# Patient Record
Sex: FEMALE | Race: WHITE | Employment: FULL TIME | URBAN - NONMETROPOLITAN AREA
[De-identification: names, ages, dates, MRNs, and addresses within clinical notes are randomized per-mention and may not be internally consistent; named-entity substitution may affect disease eponyms.]

---

## 2017-01-17 RX ORDER — DROSPIRENONE AND ETHINYL ESTRADIOL 0.02-3(28)
1 KIT ORAL DAILY
Qty: 84 TABLET | Refills: 1 | Status: SHIPPED | OUTPATIENT
Start: 2017-01-17 | End: 2017-06-20 | Stop reason: ALTCHOICE

## 2017-05-19 ENCOUNTER — TELEPHONE (OUTPATIENT)
Dept: FAMILY MEDICINE CLINIC | Age: 22
End: 2017-05-19

## 2017-05-19 DIAGNOSIS — B37.31 VAGINAL YEAST INFECTION: ICD-10-CM

## 2017-05-19 DIAGNOSIS — N39.0 URINARY TRACT INFECTION, SITE UNSPECIFIED: Primary | ICD-10-CM

## 2017-06-20 ENCOUNTER — OFFICE VISIT (OUTPATIENT)
Dept: OBGYN CLINIC | Age: 22
End: 2017-06-20

## 2017-06-20 VITALS
HEART RATE: 91 BPM | HEIGHT: 65 IN | TEMPERATURE: 97.9 F | SYSTOLIC BLOOD PRESSURE: 118 MMHG | DIASTOLIC BLOOD PRESSURE: 62 MMHG | WEIGHT: 108.25 LBS | BODY MASS INDEX: 18.03 KG/M2

## 2017-06-20 DIAGNOSIS — N92.1 METRORRHAGIA: Primary | ICD-10-CM

## 2017-06-20 DIAGNOSIS — R63.6 UNDERWEIGHT: ICD-10-CM

## 2017-06-20 LAB
BASOPHILS ABSOLUTE: 0 K/UL (ref 0–0.2)
BASOPHILS RELATIVE PERCENT: 0.3 %
EOSINOPHILS ABSOLUTE: 0.1 K/UL (ref 0–0.6)
EOSINOPHILS RELATIVE PERCENT: 2.2 %
HCT VFR BLD CALC: 41.4 % (ref 36–48)
HEMOGLOBIN: 13.4 G/DL (ref 12–16)
LYMPHOCYTES ABSOLUTE: 0.9 K/UL (ref 1–5.1)
LYMPHOCYTES RELATIVE PERCENT: 26.8 %
MCH RBC QN AUTO: 29.4 PG (ref 26–34)
MCHC RBC AUTO-ENTMCNC: 32.3 G/DL (ref 31–36)
MCV RBC AUTO: 91.1 FL (ref 80–100)
MONOCYTES ABSOLUTE: 0.3 K/UL (ref 0–1.3)
MONOCYTES RELATIVE PERCENT: 9.7 %
NEUTROPHILS ABSOLUTE: 2.1 K/UL (ref 1.7–7.7)
NEUTROPHILS RELATIVE PERCENT: 61 %
PDW BLD-RTO: 13.5 % (ref 12.4–15.4)
PLATELET # BLD: 322 K/UL (ref 135–450)
PMV BLD AUTO: 8.6 FL (ref 5–10.5)
PROLACTIN: 16.5 NG/ML
RBC # BLD: 4.54 M/UL (ref 4–5.2)
TSH REFLEX: 1.29 UIU/ML (ref 0.27–4.2)
WBC # BLD: 3.5 K/UL (ref 4–11)

## 2017-06-20 PROCEDURE — 36415 COLL VENOUS BLD VENIPUNCTURE: CPT | Performed by: OBSTETRICS & GYNECOLOGY

## 2017-06-20 PROCEDURE — 99202 OFFICE O/P NEW SF 15 MIN: CPT | Performed by: OBSTETRICS & GYNECOLOGY

## 2017-06-20 RX ORDER — ACETAMINOPHEN 500 MG
TABLET ORAL
COMMUNITY
End: 2017-09-28 | Stop reason: CLARIF

## 2017-06-20 RX ORDER — IBUPROFEN 200 MG
TABLET ORAL
COMMUNITY
End: 2018-08-22

## 2017-06-20 RX ORDER — LEVONORGESTREL AND ETHINYL ESTRADIOL 0.15-0.03
1 KIT ORAL DAILY
Qty: 1 PACKET | Refills: 3 | Status: SHIPPED | OUTPATIENT
Start: 2017-06-20 | End: 2017-08-14 | Stop reason: SDUPTHER

## 2017-06-20 ASSESSMENT — ENCOUNTER SYMPTOMS
NAUSEA: 0
DIARRHEA: 0
ABDOMINAL PAIN: 0
CONSTIPATION: 0
VOMITING: 0
SHORTNESS OF BREATH: 0
RESPIRATORY NEGATIVE: 1
GASTROINTESTINAL NEGATIVE: 1

## 2017-06-20 ASSESSMENT — PATIENT HEALTH QUESTIONNAIRE - PHQ9
SUM OF ALL RESPONSES TO PHQ9 QUESTIONS 1 & 2: 0
SUM OF ALL RESPONSES TO PHQ QUESTIONS 1-9: 0
1. LITTLE INTEREST OR PLEASURE IN DOING THINGS: 0
2. FEELING DOWN, DEPRESSED OR HOPELESS: 0

## 2017-08-14 ENCOUNTER — OFFICE VISIT (OUTPATIENT)
Dept: OBGYN CLINIC | Age: 22
End: 2017-08-14

## 2017-08-14 VITALS
BODY MASS INDEX: 18.86 KG/M2 | HEART RATE: 100 BPM | WEIGHT: 111.6 LBS | SYSTOLIC BLOOD PRESSURE: 128 MMHG | DIASTOLIC BLOOD PRESSURE: 74 MMHG | TEMPERATURE: 97.9 F

## 2017-08-14 DIAGNOSIS — Z01.419 WOMEN'S ANNUAL ROUTINE GYNECOLOGICAL EXAMINATION: Primary | ICD-10-CM

## 2017-08-14 DIAGNOSIS — R63.6 UNDERWEIGHT: ICD-10-CM

## 2017-08-14 DIAGNOSIS — B37.31 YEAST VAGINITIS: ICD-10-CM

## 2017-08-14 DIAGNOSIS — N92.1 METRORRHAGIA: ICD-10-CM

## 2017-08-14 PROCEDURE — 99395 PREV VISIT EST AGE 18-39: CPT | Performed by: OBSTETRICS & GYNECOLOGY

## 2017-08-14 PROCEDURE — 87210 SMEAR WET MOUNT SALINE/INK: CPT | Performed by: OBSTETRICS & GYNECOLOGY

## 2017-08-14 RX ORDER — LEVONORGESTREL AND ETHINYL ESTRADIOL 0.15-0.03
1 KIT ORAL DAILY
Qty: 1 PACKET | Refills: 3 | Status: SHIPPED | OUTPATIENT
Start: 2017-08-14 | End: 2017-12-28 | Stop reason: SDUPTHER

## 2017-08-14 RX ORDER — FLUCONAZOLE 150 MG/1
TABLET ORAL
Qty: 2 TABLET | Refills: 1 | Status: SHIPPED | OUTPATIENT
Start: 2017-08-14 | End: 2017-09-28 | Stop reason: ALTCHOICE

## 2017-08-29 ASSESSMENT — ENCOUNTER SYMPTOMS
NAUSEA: 0
ABDOMINAL PAIN: 0
CONSTIPATION: 0
VOMITING: 0
SHORTNESS OF BREATH: 0
ABDOMINAL DISTENTION: 0
DIARRHEA: 0

## 2017-09-28 ENCOUNTER — OFFICE VISIT (OUTPATIENT)
Dept: FAMILY MEDICINE CLINIC | Age: 22
End: 2017-09-28

## 2017-09-28 VITALS
OXYGEN SATURATION: 100 % | HEIGHT: 65 IN | DIASTOLIC BLOOD PRESSURE: 82 MMHG | HEART RATE: 109 BPM | SYSTOLIC BLOOD PRESSURE: 120 MMHG | WEIGHT: 113 LBS | BODY MASS INDEX: 18.83 KG/M2 | TEMPERATURE: 98.5 F

## 2017-09-28 DIAGNOSIS — J20.9 ACUTE BRONCHITIS, UNSPECIFIED ORGANISM: Primary | ICD-10-CM

## 2017-09-28 PROCEDURE — 99213 OFFICE O/P EST LOW 20 MIN: CPT | Performed by: FAMILY MEDICINE

## 2017-09-28 RX ORDER — PROMETHAZINE HYDROCHLORIDE AND CODEINE PHOSPHATE 6.25; 1 MG/5ML; MG/5ML
5 SYRUP ORAL EVERY 6 HOURS PRN
Qty: 120 ML | Refills: 1 | Status: SHIPPED | OUTPATIENT
Start: 2017-09-28 | End: 2018-04-25 | Stop reason: ALTCHOICE

## 2017-09-28 RX ORDER — AZITHROMYCIN 250 MG/1
TABLET, FILM COATED ORAL
Qty: 1 PACKET | Refills: 0 | Status: SHIPPED | OUTPATIENT
Start: 2017-09-28 | End: 2018-04-25 | Stop reason: ALTCHOICE

## 2017-09-28 ASSESSMENT — ENCOUNTER SYMPTOMS
DIARRHEA: 1
SHORTNESS OF BREATH: 0
COUGH: 1
CHOKING: 0
WHEEZING: 0

## 2017-12-28 RX ORDER — LEVONORGESTREL AND ETHINYL ESTRADIOL 0.15-0.03
1 KIT ORAL DAILY
Qty: 1 PACKET | Refills: 0 | Status: SHIPPED | OUTPATIENT
Start: 2017-12-28 | End: 2018-08-22 | Stop reason: SDUPTHER

## 2017-12-28 NOTE — TELEPHONE ENCOUNTER
[address] calling for a one month fill of her OCP. Patient will not be getting her home delivery in time. They are requesting a one time fill to her [facility].     Patient last seen in Office on 8/14/17 for annual exam.     Pended OCP, routing to MD to sign.

## 2018-01-04 ENCOUNTER — TELEPHONE (OUTPATIENT)
Dept: OBGYN CLINIC | Age: 23
End: 2018-01-04

## 2018-01-04 NOTE — TELEPHONE ENCOUNTER
Pt calling with c/o yeast infection. Vaginal discharge is thick white. Itch present. Denies urinary frequency. Denies urinary urgency. Denies pain with urination. No discolor to urine. No odor. No pelvic pain. No fevers. Last OV 8/14/17 pap was deferred. Pt states she has tried vagisil wipes and cream. She also has already taken Diflucan 1 dose on Monday and the second dose on Tuesday but has not got any relief.  Med pended Please advise

## 2018-01-12 ENCOUNTER — TELEPHONE (OUTPATIENT)
Dept: OBGYN CLINIC | Age: 23
End: 2018-01-12

## 2018-01-12 RX ORDER — METRONIDAZOLE 500 MG/1
500 TABLET ORAL 2 TIMES DAILY
Qty: 14 TABLET | Refills: 0 | Status: SHIPPED | OUTPATIENT
Start: 2018-01-12 | End: 2018-01-19

## 2018-01-12 NOTE — TELEPHONE ENCOUNTER
Pt calling with concerns for vaginal odor. She was treated on 1/4/17 for yeast concerns. She says she is no longer having itch or white discharge but now has odor and a slight yellow discharge. An appointment was made for 1/17/18. Pt is requesting medication until able to be seen. Pended Flagyl. Please sign if appropriate.

## 2018-02-19 ENCOUNTER — OFFICE VISIT (OUTPATIENT)
Dept: FAMILY MEDICINE CLINIC | Age: 23
End: 2018-02-19

## 2018-02-19 VITALS
HEART RATE: 131 BPM | OXYGEN SATURATION: 98 % | DIASTOLIC BLOOD PRESSURE: 77 MMHG | SYSTOLIC BLOOD PRESSURE: 117 MMHG | TEMPERATURE: 98.1 F | BODY MASS INDEX: 19.16 KG/M2 | HEIGHT: 65 IN | WEIGHT: 115 LBS

## 2018-02-19 DIAGNOSIS — J02.9 ACUTE VIRAL PHARYNGITIS: ICD-10-CM

## 2018-02-19 DIAGNOSIS — J40 BRONCHITIS: Primary | ICD-10-CM

## 2018-02-19 LAB — S PYO AG THROAT QL: NORMAL

## 2018-02-19 PROCEDURE — 87880 STREP A ASSAY W/OPTIC: CPT | Performed by: NURSE PRACTITIONER

## 2018-02-19 PROCEDURE — 99214 OFFICE O/P EST MOD 30 MIN: CPT | Performed by: NURSE PRACTITIONER

## 2018-02-19 RX ORDER — AMOXICILLIN 875 MG/1
875 TABLET, COATED ORAL 2 TIMES DAILY
Qty: 14 TABLET | Refills: 0 | Status: SHIPPED | OUTPATIENT
Start: 2018-02-19 | End: 2018-02-26

## 2018-02-19 RX ORDER — METHYLPREDNISOLONE 4 MG/1
TABLET ORAL
Qty: 1 KIT | Refills: 0 | Status: SHIPPED | OUTPATIENT
Start: 2018-02-19 | End: 2018-02-25

## 2018-02-19 ASSESSMENT — ENCOUNTER SYMPTOMS
RHINORRHEA: 1
BLOOD IN STOOL: 0
SINUS PAIN: 0
WHEEZING: 0
COLOR CHANGE: 0
SINUS PRESSURE: 1
SORE THROAT: 1
CHEST TIGHTNESS: 1
EYES NEGATIVE: 1
SHORTNESS OF BREATH: 0
COUGH: 1
CONSTIPATION: 0
ABDOMINAL PAIN: 0
VOMITING: 0
SWOLLEN GLANDS: 1
NAUSEA: 0
DIARRHEA: 0

## 2018-02-19 NOTE — LETTER
[facility]  [address] [address]  Phone: [phone]  Fax: [phone]    Lucero Schaefer, [facility] [place]        February 19, 2018     Patient: Roly Swain   YOB: 1995   Date of Visit: 2/19/2018       To Whom it May Concern:    Roly Swain was seen in my clinic on 2/19/2018. If you have any questions or concerns, please don't hesitate to call.     Sincerely,           Lucero Schaefer, CNP

## 2018-04-25 ENCOUNTER — OFFICE VISIT (OUTPATIENT)
Dept: OBGYN CLINIC | Age: 23
End: 2018-04-25

## 2018-04-25 VITALS
BODY MASS INDEX: 20.35 KG/M2 | DIASTOLIC BLOOD PRESSURE: 86 MMHG | SYSTOLIC BLOOD PRESSURE: 122 MMHG | WEIGHT: 120.4 LBS | HEART RATE: 98 BPM | TEMPERATURE: 98.1 F

## 2018-04-25 DIAGNOSIS — B96.89 BACTERIAL VAGINOSIS: Primary | ICD-10-CM

## 2018-04-25 DIAGNOSIS — N76.0 BACTERIAL VAGINOSIS: Primary | ICD-10-CM

## 2018-04-25 DIAGNOSIS — N89.8 VAGINAL DISCHARGE: ICD-10-CM

## 2018-04-25 DIAGNOSIS — R30.0 DYSURIA: ICD-10-CM

## 2018-04-25 LAB
BACTERIA: ABNORMAL /HPF
BILIRUBIN URINE: NEGATIVE
BLOOD, URINE: NEGATIVE
CLARITY: ABNORMAL
COLOR: YELLOW
EPITHELIAL CELLS, UA: 6 /HPF (ref 0–5)
GLUCOSE URINE: NEGATIVE MG/DL
HYALINE CASTS: 5 /LPF (ref 0–8)
KETONES, URINE: NEGATIVE MG/DL
LEUKOCYTE ESTERASE, URINE: ABNORMAL
MICROSCOPIC EXAMINATION: YES
NITRITE, URINE: NEGATIVE
PH UA: 7
PROTEIN UA: NEGATIVE MG/DL
RBC UA: 5 /HPF (ref 0–4)
SPECIFIC GRAVITY UA: 1.01
UROBILINOGEN, URINE: 1 E.U./DL
WBC UA: 3 /HPF (ref 0–5)

## 2018-04-25 PROCEDURE — 81001 URINALYSIS AUTO W/SCOPE: CPT | Performed by: OBSTETRICS & GYNECOLOGY

## 2018-04-25 PROCEDURE — 99213 OFFICE O/P EST LOW 20 MIN: CPT | Performed by: OBSTETRICS & GYNECOLOGY

## 2018-04-25 RX ORDER — FLUCONAZOLE 150 MG/1
TABLET ORAL
Qty: 2 TABLET | Refills: 1 | Status: SHIPPED | OUTPATIENT
Start: 2018-04-25 | End: 2018-08-22

## 2018-04-25 RX ORDER — METRONIDAZOLE 500 MG/1
500 TABLET ORAL 2 TIMES DAILY
Qty: 14 TABLET | Refills: 0 | Status: SHIPPED | OUTPATIENT
Start: 2018-04-25 | End: 2018-05-02

## 2018-04-25 ASSESSMENT — ENCOUNTER SYMPTOMS
DIARRHEA: 0
ABDOMINAL PAIN: 0
NAUSEA: 0
VOMITING: 0
CONSTIPATION: 0
SHORTNESS OF BREATH: 0

## 2018-04-26 LAB
CANDIDA SPECIES, DNA PROBE: NORMAL
GARDNERELLA VAGINALIS, DNA PROBE: NORMAL
TRICHOMONAS VAGINALIS DNA: NORMAL

## 2018-04-27 LAB
ORGANISM: ABNORMAL
URINE CULTURE, ROUTINE: ABNORMAL
URINE CULTURE, ROUTINE: ABNORMAL

## 2018-08-22 ENCOUNTER — OFFICE VISIT (OUTPATIENT)
Dept: FAMILY MEDICINE CLINIC | Age: 23
End: 2018-08-22

## 2018-08-22 VITALS
HEART RATE: 97 BPM | OXYGEN SATURATION: 98 % | WEIGHT: 122.6 LBS | DIASTOLIC BLOOD PRESSURE: 92 MMHG | SYSTOLIC BLOOD PRESSURE: 121 MMHG | BODY MASS INDEX: 20.72 KG/M2

## 2018-08-22 DIAGNOSIS — F43.9 SITUATIONAL STRESS: Primary | ICD-10-CM

## 2018-08-22 PROCEDURE — G0444 DEPRESSION SCREEN ANNUAL: HCPCS | Performed by: FAMILY MEDICINE

## 2018-08-22 PROCEDURE — 99214 OFFICE O/P EST MOD 30 MIN: CPT | Performed by: FAMILY MEDICINE

## 2018-08-22 RX ORDER — SERTRALINE HYDROCHLORIDE 25 MG/1
25 TABLET, FILM COATED ORAL DAILY
Qty: 30 TABLET | Refills: 1 | Status: SHIPPED | OUTPATIENT
Start: 2018-08-22 | End: 2018-09-18 | Stop reason: SDUPTHER

## 2018-08-22 RX ORDER — DOXEPIN HYDROCHLORIDE 10 MG/1
10 CAPSULE ORAL NIGHTLY PRN
Qty: 15 CAPSULE | Refills: 0 | Status: SHIPPED | OUTPATIENT
Start: 2018-08-22 | End: 2018-09-18 | Stop reason: SDUPTHER

## 2018-08-22 ASSESSMENT — PATIENT HEALTH QUESTIONNAIRE - PHQ9
3. TROUBLE FALLING OR STAYING ASLEEP: 3
10. IF YOU CHECKED OFF ANY PROBLEMS, HOW DIFFICULT HAVE THESE PROBLEMS MADE IT FOR YOU TO DO YOUR WORK, TAKE CARE OF THINGS AT HOME, OR GET ALONG WITH OTHER PEOPLE: 1
SUM OF ALL RESPONSES TO PHQ9 QUESTIONS 1 & 2: 6
1. LITTLE INTEREST OR PLEASURE IN DOING THINGS: 3
7. TROUBLE CONCENTRATING ON THINGS, SUCH AS READING THE NEWSPAPER OR WATCHING TELEVISION: 1
SUM OF ALL RESPONSES TO PHQ QUESTIONS 1-9: 18
8. MOVING OR SPEAKING SO SLOWLY THAT OTHER PEOPLE COULD HAVE NOTICED. OR THE OPPOSITE, BEING SO FIGETY OR RESTLESS THAT YOU HAVE BEEN MOVING AROUND A LOT MORE THAN USUAL: 1
6. FEELING BAD ABOUT YOURSELF - OR THAT YOU ARE A FAILURE OR HAVE LET YOURSELF OR YOUR FAMILY DOWN: 1
2. FEELING DOWN, DEPRESSED OR HOPELESS: 3
4. FEELING TIRED OR HAVING LITTLE ENERGY: 3
9. THOUGHTS THAT YOU WOULD BE BETTER OFF DEAD, OR OF HURTING YOURSELF: 0
SUM OF ALL RESPONSES TO PHQ QUESTIONS 1-9: 18
5. POOR APPETITE OR OVEREATING: 3

## 2018-08-22 NOTE — PROGRESS NOTES
Subjective:      Patient ID: Raven El is a 20 y.o. female. HPI  Chief Complaint   Patient presents with    Anxiety     having attacks     Insomnia     going on since end of may     Anorexia     Chief complaint present illness: 68-year-old white female presents with complaints of increased anxiety, trouble sleeping, and repeatedly saying she just had as much she can take. This is not a desperation or suicidal or homicidal but rather the stresses of trying to not cut off relationships with her mother in spite of her mother's incessant, and the patient's opinion, efforts to make her feel guilty about the patient's relationship with her live-in boyfriend and lack of going to [employer] as much as her mother would like her to. Patient tries to maintain contact with her mother who expects to be talked to at least once a day. She does this partially so that her mother will go crazy or drive her crazy or drive her father, whom she feels bad 4, crazy. Patient is also unhappy in her current job. She changed jobs in March 2018 and is still on probation which is not unusual but she says this job is not what she thought it would be. She doesn't like what she does and she doesn't like the pain that she receives for doing it. Patient just states she's anxious a lot. Denies any significant severe depressive symptomatology. Finds that the only way she deals with her anxiety is by incessantly cleaning and obsessively doing things. Has trouble turning her brain off at night. Review of Systems   All other systems reviewed and are negative. background/entire past medical,social and family history obtained and reviewed/updated today   Objective:   Physical Exam   Constitutional: She appears well-developed and well-nourished. She appears distressed. Psychiatric: Her behavior is normal. Judgment and thought content normal. Her mood appears anxious. Her affect is not angry, not blunt and not labile.  Her speech is not rapid and/or pressured and not tangential. She is not agitated, not hyperactive and not withdrawn. Cognition and memory are normal. She does not express impulsivity. She does not exhibit a depressed mood. Nursing note and vitals reviewed. BP (!) 121/92 (Site: Left Arm, Position: Sitting, Cuff Size: Small Adult)   Pulse 97   Wt 122 lb 9.6 oz (55.6 kg)   LMP 05/22/2018   SpO2 98%   BMI 20.72 kg/m²   >30 minutes with patient, all one on one or with accompanying family member re illness, possible etiologies,w/u and treatment with greater than 50% of time in counseling for: Anxiety; discussion also about counseling. This apparently did not work one time her mother went. [facility] and could be heard from the waiting room of the counselors office. Assessment:      Alicia Kirby was seen today for anxiety, insomnia and anorexia. Diagnoses and all orders for this visit:    Situational stress  -     sertraline (ZOLOFT) 25 MG tablet; Take 1 tablet by mouth daily  -     doxepin (SINEQUAN) 10 MG capsule; Take 1 capsule by mouth nightly as needed (sleep)           Plan:      Return in about 4 weeks (around 9/19/2018).    Patient Instructions   new med;explained s.e;how to take;indications            Karine Duc

## 2018-09-11 ENCOUNTER — APPOINTMENT (OUTPATIENT)
Dept: CT IMAGING | Age: 23
End: 2018-09-11
Payer: COMMERCIAL

## 2018-09-11 ENCOUNTER — HOSPITAL ENCOUNTER (EMERGENCY)
Age: 23
Discharge: HOME | End: 2018-09-11
Payer: COMMERCIAL

## 2018-09-11 VITALS
DIASTOLIC BLOOD PRESSURE: 83 MMHG | TEMPERATURE: 98 F | HEIGHT: 65 IN | SYSTOLIC BLOOD PRESSURE: 140 MMHG | BODY MASS INDEX: 19.99 KG/M2 | RESPIRATION RATE: 17 BRPM | WEIGHT: 120 LBS | OXYGEN SATURATION: 98 % | HEART RATE: 87 BPM

## 2018-09-11 DIAGNOSIS — N20.1 RIGHT URETERAL STONE: Primary | ICD-10-CM

## 2018-09-11 DIAGNOSIS — R11.0 NAUSEA: ICD-10-CM

## 2018-09-11 DIAGNOSIS — R91.8 ABNORMAL CT SCAN OF LUNG: ICD-10-CM

## 2018-09-11 DIAGNOSIS — N23 RENAL COLIC: ICD-10-CM

## 2018-09-11 LAB
A/G RATIO: 1.4 (ref 1.1–2.2)
ALBUMIN SERPL-MCNC: 4.3 G/DL (ref 3.4–5)
ALP BLD-CCNC: 51 U/L (ref 40–129)
ALT SERPL-CCNC: 10 U/L (ref 10–40)
ANION GAP SERPL CALCULATED.3IONS-SCNC: 18 MMOL/L (ref 3–16)
AST SERPL-CCNC: 13 U/L (ref 15–37)
BACTERIA: ABNORMAL /HPF
BASOPHILS ABSOLUTE: 0.1 K/UL (ref 0–0.2)
BASOPHILS RELATIVE PERCENT: 0.7 %
BILIRUB SERPL-MCNC: 0.5 MG/DL (ref 0–1)
BILIRUBIN URINE: NEGATIVE
BLOOD, URINE: ABNORMAL
BUN BLDV-MCNC: 9 MG/DL (ref 7–20)
CALCIUM SERPL-MCNC: 9 MG/DL (ref 8.3–10.6)
CHLORIDE BLD-SCNC: 102 MMOL/L (ref 99–110)
CLARITY: CLEAR
CO2: 19 MMOL/L (ref 21–32)
COLOR: YELLOW
CREAT SERPL-MCNC: 0.7 MG/DL (ref 0.6–1.1)
EOSINOPHILS ABSOLUTE: 0.1 K/UL (ref 0–0.6)
EOSINOPHILS RELATIVE PERCENT: 0.6 %
EPITHELIAL CELLS, UA: ABNORMAL /HPF
GFR AFRICAN AMERICAN: >60
GFR NON-AFRICAN AMERICAN: >60
GLOBULIN: 3 G/DL
GLUCOSE BLD-MCNC: 111 MG/DL (ref 70–99)
GLUCOSE URINE: NEGATIVE MG/DL
HCG QUALITATIVE: NEGATIVE
HCT VFR BLD CALC: 38.3 % (ref 36–48)
HEMOGLOBIN: 13 G/DL (ref 12–16)
KETONES, URINE: >=80 MG/DL
LEUKOCYTE ESTERASE, URINE: NEGATIVE
LIPASE: 18 U/L (ref 13–60)
LYMPHOCYTES ABSOLUTE: 0.9 K/UL (ref 1–5.1)
LYMPHOCYTES RELATIVE PERCENT: 9.7 %
MAGNESIUM: 2 MG/DL (ref 1.8–2.4)
MCH RBC QN AUTO: 30.4 PG (ref 26–34)
MCHC RBC AUTO-ENTMCNC: 34 G/DL (ref 31–36)
MCV RBC AUTO: 89.6 FL (ref 80–100)
MICROSCOPIC EXAMINATION: YES
MONOCYTES ABSOLUTE: 0.6 K/UL (ref 0–1.3)
MONOCYTES RELATIVE PERCENT: 6.1 %
MUCUS: ABNORMAL /LPF
NEUTROPHILS ABSOLUTE: 7.7 K/UL (ref 1.7–7.7)
NEUTROPHILS RELATIVE PERCENT: 82.9 %
NITRITE, URINE: NEGATIVE
PDW BLD-RTO: 13.2 % (ref 12.4–15.4)
PH UA: >=9
PLATELET # BLD: 293 K/UL (ref 135–450)
PMV BLD AUTO: 8.7 FL (ref 5–10.5)
POTASSIUM REFLEX MAGNESIUM: 3.1 MMOL/L (ref 3.5–5.1)
PROTEIN UA: ABNORMAL MG/DL
RBC # BLD: 4.27 M/UL (ref 4–5.2)
RBC UA: ABNORMAL /HPF (ref 0–2)
SODIUM BLD-SCNC: 139 MMOL/L (ref 136–145)
SPECIFIC GRAVITY UA: 1.02
SPECIMEN STATUS: NORMAL
TOTAL PROTEIN: 7.3 G/DL (ref 6.4–8.2)
URINE TYPE: ABNORMAL
UROBILINOGEN, URINE: 1 E.U./DL
WBC # BLD: 9.3 K/UL (ref 4–11)
WBC UA: ABNORMAL /HPF (ref 0–5)

## 2018-09-11 PROCEDURE — 99284 EMERGENCY DEPT VISIT MOD MDM: CPT

## 2018-09-11 PROCEDURE — 85025 COMPLETE CBC W/AUTO DIFF WBC: CPT

## 2018-09-11 PROCEDURE — 74176 CT ABD & PELVIS W/O CONTRAST: CPT

## 2018-09-11 PROCEDURE — 2500000003 HC RX 250 WO HCPCS: Performed by: EMERGENCY MEDICINE

## 2018-09-11 PROCEDURE — 6360000002 HC RX W HCPCS: Performed by: PHYSICIAN ASSISTANT

## 2018-09-11 PROCEDURE — 6360000002 HC RX W HCPCS: Performed by: EMERGENCY MEDICINE

## 2018-09-11 PROCEDURE — 6370000000 HC RX 637 (ALT 250 FOR IP): Performed by: PHYSICIAN ASSISTANT

## 2018-09-11 PROCEDURE — 2580000003 HC RX 258: Performed by: EMERGENCY MEDICINE

## 2018-09-11 PROCEDURE — 80053 COMPREHEN METABOLIC PANEL: CPT

## 2018-09-11 PROCEDURE — 81001 URINALYSIS AUTO W/SCOPE: CPT

## 2018-09-11 PROCEDURE — 83690 ASSAY OF LIPASE: CPT

## 2018-09-11 PROCEDURE — 84703 CHORIONIC GONADOTROPIN ASSAY: CPT

## 2018-09-11 PROCEDURE — 83735 ASSAY OF MAGNESIUM: CPT

## 2018-09-11 PROCEDURE — 96375 TX/PRO/DX INJ NEW DRUG ADDON: CPT

## 2018-09-11 PROCEDURE — S0028 INJECTION, FAMOTIDINE, 20 MG: HCPCS | Performed by: EMERGENCY MEDICINE

## 2018-09-11 PROCEDURE — 96374 THER/PROPH/DIAG INJ IV PUSH: CPT

## 2018-09-11 PROCEDURE — 96361 HYDRATE IV INFUSION ADD-ON: CPT

## 2018-09-11 RX ORDER — KETOROLAC TROMETHAMINE 10 MG/1
10 TABLET, FILM COATED ORAL EVERY 6 HOURS PRN
Qty: 20 TABLET | Refills: 0 | Status: SHIPPED | OUTPATIENT
Start: 2018-09-11 | End: 2018-09-18 | Stop reason: ALTCHOICE

## 2018-09-11 RX ORDER — 0.9 % SODIUM CHLORIDE 0.9 %
1000 INTRAVENOUS SOLUTION INTRAVENOUS ONCE
Status: COMPLETED | OUTPATIENT
Start: 2018-09-11 | End: 2018-09-11

## 2018-09-11 RX ORDER — ONDANSETRON 4 MG/1
4 TABLET, FILM COATED ORAL EVERY 8 HOURS PRN
Qty: 12 TABLET | Refills: 0 | Status: SHIPPED | OUTPATIENT
Start: 2018-09-11 | End: 2018-09-18 | Stop reason: ALTCHOICE

## 2018-09-11 RX ORDER — POTASSIUM CHLORIDE 750 MG/1
40 TABLET, FILM COATED, EXTENDED RELEASE ORAL ONCE
Status: COMPLETED | OUTPATIENT
Start: 2018-09-11 | End: 2018-09-11

## 2018-09-11 RX ORDER — MORPHINE SULFATE 4 MG/ML
4 INJECTION, SOLUTION INTRAMUSCULAR; INTRAVENOUS
Status: DISCONTINUED | OUTPATIENT
Start: 2018-09-11 | End: 2018-09-11 | Stop reason: HOSPADM

## 2018-09-11 RX ORDER — TAMSULOSIN HYDROCHLORIDE 0.4 MG/1
0.4 CAPSULE ORAL DAILY
Qty: 14 CAPSULE | Refills: 0 | Status: SHIPPED | OUTPATIENT
Start: 2018-09-11 | End: 2019-05-01

## 2018-09-11 RX ORDER — KETOROLAC TROMETHAMINE 30 MG/ML
30 INJECTION, SOLUTION INTRAMUSCULAR; INTRAVENOUS ONCE
Status: COMPLETED | OUTPATIENT
Start: 2018-09-11 | End: 2018-09-11

## 2018-09-11 RX ORDER — ONDANSETRON 2 MG/ML
4 INJECTION INTRAMUSCULAR; INTRAVENOUS EVERY 30 MIN PRN
Status: DISCONTINUED | OUTPATIENT
Start: 2018-09-11 | End: 2018-09-11 | Stop reason: HOSPADM

## 2018-09-11 RX ADMIN — MORPHINE SULFATE 4 MG: 4 INJECTION, SOLUTION INTRAMUSCULAR; INTRAVENOUS at 01:48

## 2018-09-11 RX ADMIN — SODIUM CHLORIDE 1000 ML: 9 INJECTION, SOLUTION INTRAVENOUS at 02:43

## 2018-09-11 RX ADMIN — KETOROLAC TROMETHAMINE 30 MG: 30 INJECTION, SOLUTION INTRAMUSCULAR at 02:41

## 2018-09-11 RX ADMIN — FAMOTIDINE 20 MG: 10 INJECTION, SOLUTION INTRAVENOUS at 01:48

## 2018-09-11 RX ADMIN — ONDANSETRON 4 MG: 2 INJECTION INTRAMUSCULAR; INTRAVENOUS at 01:48

## 2018-09-11 RX ADMIN — POTASSIUM CHLORIDE 40 MEQ: 750 TABLET, EXTENDED RELEASE ORAL at 02:41

## 2018-09-11 RX ADMIN — SODIUM CHLORIDE 1000 ML: 9 INJECTION, SOLUTION INTRAVENOUS at 02:11

## 2018-09-11 ASSESSMENT — PAIN SCALES - GENERAL
PAINLEVEL_OUTOF10: 10

## 2018-09-11 ASSESSMENT — PAIN DESCRIPTION - LOCATION: LOCATION: BACK;PELVIS;ABDOMEN

## 2018-09-11 NOTE — ED PROVIDER NOTES
[facility] Emergency Department    CHIEF COMPLAINT  Abdominal Pain (radiates to back started yesterday morning) and Emesis      HISTORY OF PRESENT ILLNESS  Suzan Gresham is a 20 y.o. female who presents to the ED complaining of two-day history of right-sided abdominal pain. Patient observed lying in bed, appears nontoxic and in no acute distress at this time. Patient accompanied today by parents for evaluation. Patient states that she started with right-sided abdominal pain yesterday. Progressively worsening over past 2 days. Currently rated at a 10 out of 10. Sharp and stabbing in nature. Appears to radiate from flank and down around the right side of abdomen. Has had some nausea as well as vomiting. No diarrhea or constipation. No black or bloody stools. Denies fevers chills. No recent travel. No sick contacts. Reports decreased urination. No dysuria. Does not currently have regular periods that she reports use of oral contraceptives. No vaginal bleeding, pain or discharge. Mild right back pain. No chest pain shortness of breath. No headache, lightheadedness, dizziness or confusion. No abdominal surgeries. No other complaints, modifying factors or associated symptoms. Nursing notes reviewed. Past Medical History:   Diagnosis Date    Acne     Anxiety     Benign mole     on right nasal bridge, Dr. Quintin Reece, [facility]     No past surgical history on file.   Family History   Problem Relation Age of Onset    Cancer Paternal Uncle 47        pancreatic cancer    Heart Disease Maternal Grandmother         valve replaced    COPD Maternal Grandmother     Coronary Art Dis Maternal Grandmother     Heart Disease Maternal Grandfather     Stroke Paternal Grandfather     Other Maternal Uncle     Other Paternal Aunt      Social History     Social History    Marital status: Single     Spouse name: N/A    Number of children: N/A    Years of education: N/A obturator, or heel strike. No fluid wave or ascites. No hernias or masses. Bowel sounds normal quadrants. Mild right CVA tenderness. EXTREMITIES: No peripheral edema. Moves all extremities equally. All extremities neurovascularly intact. SKIN: Warm and dry. No acute rashes. NEUROLOGICAL: Alert and oriented. CN's 2-12 intact. No gross facial drooping. Strength 5/5, sensation intact. PSYCHIATRIC: Normal mood and affect. RADIOLOGY  Ct Abdomen Pelvis Wo Contrast Additional Contrast? None    Result Date: 9/11/2018  EXAMINATION: CT OF THE ABDOMEN AND PELVIS WITHOUT CONTRAST 9/11/2018 2:42 am TECHNIQUE: CT of the abdomen and pelvis was performed without the administration of intravenous contrast. Multiplanar reformatted images are provided for review. Dose modulation, iterative reconstruction, and/or weight based adjustment of the mA/kV was utilized to reduce the radiation dose to as low as reasonably achievable. COMPARISON: Chest radiograph 09/05/2010 HISTORY: ORDERING SYSTEM PROVIDED HISTORY: R flank pain, hematuria TECHNOLOGIST PROVIDED HISTORY: Additional Contrast?->None FINDINGS: ABDOMEN Liver: Normal liver size, contour and parenchymal attenuation. No focal lesion on this noncontrast evaluation. Gallbladder: Normal. Biliary: No intra or extrahepatic bile duct dilatation. Pancreas: Normal. Spleen: Normal. Adrenals: Normal. Kidneys: 2 mm stone at the right ureterovesicular junction with mild upstream hydroureteronephrosis. Right perinephric and periureteral inflammatory stranding. Right kidney is slightly edematous compared with the left. No left nephrolithiasis or hydronephrosis. GI Tract: No apparent wall thickening or obstruction. Questionable identification of the appendix along the right pelvic sidewall. No pericecal or periappendiceal inflammatory changes. Peritoneum/Retroperitoneum: No enlarged lymph nodes, free air or free fluid. Vascular:  Normal caliber abdominal aorta.  PELVIS Genitourinary: Normal urinary bladder. Normal reproductive organs. Other: No free fluid. No enlarged lymph nodes. MUSCULOSKELETAL Bones and Soft Tissues: Normal bones and soft tissues. Other: Indeterminate nodular and ground-glass opacity in the left lower lobe. Normal included heart and pericardium. Obstructing 2 mm right UVJ stone with mild upstream hydroureteronephrosis. Indeterminate nodular and ground-glass focus at the left lung base. Any recent comparison imaging would be helpful if available. Otherwise, recommend three-month follow-up dedicated CT chest.     ED COURSE   I have evaluated this patient in collaboration with Dr. Kenisha Flores. Patient received morphine, Zofran and Pepcid IV for pain and nausea, with good relief. Triage vital stable. Patient given 1 L IV fluid bolus. Urinalysis showing large blood. No evidence of UTI. CBC without leukocytosis or an lipase normal.  Magnesium within normal limits. Qualitative hCG was negative.  emia. CMP mild hypokalemia 3.1. Patient given a 40 mEq of KDur by mouth. CT scan of abdomen and pelvis with obstructing 2 mm right UVJ stone with mild hydroureteronephrosis. Patient feeling better on reevaluation. Will be discharged home with urology referral for further evaluation more definitive care. Also discussed return precautions and patient is in agreement and comfortable discharge. A discussion was had with Mrs. Peter Marr regarding right-sided flank pain, ED findings and recommendations for follow-up. All questions were answered. Patient will follow up with  urology within 2-3 days for further evaluation/treatment. Patient will return to ED for new/worsening symptoms. Patient was sent home with a prescription for Toradol, Zofran, and Flomax.     MDM  Results for orders placed or performed during the hospital encounter of 09/11/18   CBC auto differential   Result Value Ref Range    WBC 9.3 4.0 - 11.0 K/uL    RBC 4.27 4.00 - 5.20 M/uL    Hemoglobin 13.0 12.0 - 16.0 g/dL    Hematocrit 38.3 36.0 - 48.0 %    MCV 89.6 80.0 - 100.0 fL    MCH 30.4 26.0 - 34.0 pg    MCHC 34.0 31.0 - 36.0 g/dL    RDW 13.2 12.4 - 15.4 %    Platelets 033 231 - 593 K/uL    MPV 8.7 5.0 - 10.5 fL    Neutrophils % 82.9 %    Lymphocytes % 9.7 %    Monocytes % 6.1 %    Eosinophils % 0.6 %    Basophils % 0.7 %    Neutrophils # 7.7 1.7 - 7.7 K/uL    Lymphocytes # 0.9 (L) 1.0 - 5.1 K/uL    Monocytes # 0.6 0.0 - 1.3 K/uL    Eosinophils # 0.1 0.0 - 0.6 K/uL    Basophils # 0.1 0.0 - 0.2 K/uL   Comprehensive Metabolic Panel w/ Reflex to MG   Result Value Ref Range    Sodium 139 136 - 145 mmol/L    Potassium reflex Magnesium 3.1 (L) 3.5 - 5.1 mmol/L    Chloride 102 99 - 110 mmol/L    CO2 19 (L) 21 - 32 mmol/L    Anion Gap 18 (H) 3 - 16    Glucose 111 (H) 70 - 99 mg/dL    BUN 9 7 - 20 mg/dL    CREATININE 0.7 0.6 - 1.1 mg/dL    GFR Non-African American >60 >60    GFR African American >60 >60    Calcium 9.0 8.3 - 10.6 mg/dL    Total Protein 7.3 6.4 - 8.2 g/dL    Alb 4.3 3.4 - 5.0 g/dL    Albumin/Globulin Ratio 1.4 1.1 - 2.2    Total Bilirubin 0.5 0.0 - 1.0 mg/dL    Alkaline Phosphatase 51 40 - 129 U/L    ALT 10 10 - 40 U/L    AST 13 (L) 15 - 37 U/L    Globulin 3.0 g/dL   Lipase   Result Value Ref Range    Lipase 18.0 13.0 - 60.0 U/L   HCG Qualitative, Serum   Result Value Ref Range    hCG Qual Negative Detects HCG level >10 MIU/mL   Urinalysis, reflex to microscopic   Result Value Ref Range    Color, UA Yellow Straw/Yellow    Clarity, UA Clear Clear    Glucose, Ur Negative Negative mg/dL    Bilirubin Urine Negative Negative    Ketones, Urine >=80 (AA) Negative mg/dL    Specific Gravity, UA 1.020 1.005 - 1.030    Blood, Urine LARGE (A) Negative    pH, UA >=9.0 (A) 5.0 - 8.0    Protein, UA TRACE (A) Negative mg/dL    Urobilinogen, Urine 1.0 <2.0 E.U./dL    Nitrite, Urine Negative Negative    Leukocyte Esterase, Urine Negative Negative    Microscopic Examination YES     Urine Type Not Specified    Sample possible blood bank testing   Result Value Ref Range    Specimen Status MADHU    Microscopic Urinalysis   Result Value Ref Range    Mucus, UA 1+ (A) /LPF    WBC, UA 0-2 0 - 5 /HPF    RBC, UA 20-50 (A) 0 - 2 /HPF    Epi Cells 5-10 /HPF    Bacteria, UA 3+ (A) /HPF   Magnesium   Result Value Ref Range    Magnesium 2.00 1.80 - 2.40 mg/dL     I estimate there is LOW risk for ACUTE APPENDICITIS, BOWEL OBSTRUCTION, CHOLECYSTITIS, DIVERTICULITIS, INCARCERATED HERNIA, PANCREATITIS, or PERFORATED BOWEL or ULCER, thus I consider the discharge disposition reasonable. Also, there is no evidence or peritonitis, sepsis, or toxicity. Evaristo Nolasco and I have discussed the diagnosis and risks, and we agree with discharging home to follow-up with their primary doctor. We also discussed returning to the Emergency Department immediately if new or worsening symptoms occur. We have discussed the symptoms which are most concerning (e.g., bloody stool, fever, changing or worsening pain, vomiting) that necessitate immediate return. FINAL Impression  1. Right ureteral stone    2. Abnormal CT scan of lung    3. Nausea    4. Renal colic      Blood pressure (!) 140/83, pulse 87, temperature 98 °F (36.7 °C), temperature source Axillary, resp. rate 17, height 5' 5\" (1.651 m), weight 120 lb (54.4 kg), last menstrual period 05/22/2018, SpO2 98 %, not currently breastfeeding. DISPOSITION  Patient was discharged to home in good condition.          [place], [place]  09/11/18 [id number]

## 2018-09-18 ENCOUNTER — OFFICE VISIT (OUTPATIENT)
Dept: FAMILY MEDICINE CLINIC | Age: 23
End: 2018-09-18

## 2018-09-18 VITALS
TEMPERATURE: 99.9 F | RESPIRATION RATE: 16 BRPM | HEART RATE: 94 BPM | SYSTOLIC BLOOD PRESSURE: 119 MMHG | WEIGHT: 122 LBS | DIASTOLIC BLOOD PRESSURE: 83 MMHG | OXYGEN SATURATION: 99 % | BODY MASS INDEX: 20.3 KG/M2

## 2018-09-18 DIAGNOSIS — Z23 NEED FOR INFLUENZA VACCINATION: Primary | ICD-10-CM

## 2018-09-18 DIAGNOSIS — F43.9 SITUATIONAL STRESS: ICD-10-CM

## 2018-09-18 DIAGNOSIS — R93.89 ABNORMAL CHEST XRAY: ICD-10-CM

## 2018-09-18 PROCEDURE — 90688 IIV4 VACCINE SPLT 0.5 ML IM: CPT | Performed by: FAMILY MEDICINE

## 2018-09-18 PROCEDURE — 99213 OFFICE O/P EST LOW 20 MIN: CPT | Performed by: FAMILY MEDICINE

## 2018-09-18 PROCEDURE — 90471 IMMUNIZATION ADMIN: CPT | Performed by: FAMILY MEDICINE

## 2018-09-18 RX ORDER — DOXEPIN HYDROCHLORIDE 10 MG/1
10 CAPSULE ORAL NIGHTLY PRN
Qty: 90 CAPSULE | Refills: 1 | Status: SHIPPED | OUTPATIENT
Start: 2018-09-18 | End: 2019-05-01

## 2018-09-18 RX ORDER — SERTRALINE HYDROCHLORIDE 25 MG/1
25 TABLET, FILM COATED ORAL DAILY
Qty: 90 TABLET | Refills: 1 | Status: SHIPPED | OUTPATIENT
Start: 2018-09-18 | End: 2019-05-01

## 2018-09-18 NOTE — PROGRESS NOTES
Subjective:      Patient ID: Suzan Gresham is a 20 y.o. female. HPI  Chief Complaint   Patient presents with    Follow-Up from [facility] stones; off Flomax; no prior history of same; recent hospital admission/er visit and all available records from that admission/encounter reviewed in detail with patient;       9/11/18 went to [facility] for Kidney stone     Depression-The kidney stone issue brought her mother back in full force. Some issues with letting her daughter go back home to her own apartment. Patient understands that at this point her mother will not change and said that she is going to need to learn how to accept it and not let it bother her so much. Not an easy task      was placed on zoloft and was suppose to follow up on 9/19/18    Pulmonary Nodule-Reviewed CT scanabdomenincidental findinggroundglass. Nonsmoker without really no complaints      found on CT Scan 9/11/18      Chief complaint present illness: 78-year-old white female presents unaccompanied for follow-up on her depression/anxiety but also 2 new issuesplease see above    Review of Systems   Constitutional: Negative. Negative for fever. Respiratory: Negative. Cardiovascular: Negative. Genitourinary: Negative. Asymptomatic at the current time; did have flank and groin pain on the correct side when she had the kidney stone. background/entire past medical,social and family history obtained and reviewed/updated today   Objective:   Physical Exam   Constitutional: She appears well-developed and well-nourished. Cardiovascular: Normal rate, regular rhythm and normal heart sounds. Pulmonary/Chest: Effort normal and breath sounds normal. She has no wheezes. She has no rales. Abdominal: Soft. She exhibits no mass. Lymphadenopathy:     She has no cervical adenopathy. She has no axillary adenopathy. Right: No supraclavicular adenopathy present.         Left: No supraclavicular adenopathy present. Neurological: She is alert. Skin: Skin is warm. No pallor. Psychiatric: She has a normal mood and affect. Nursing note and vitals reviewed. /83 (Site: Left Upper Arm, Position: Sitting, Cuff Size: Small Adult)   Pulse 94   Temp 99.9 °F (37.7 °C) (Oral)   Resp 16   Wt 122 lb (55.3 kg)   LMP 08/22/2018 (Approximate)   SpO2 99%   BMI 20.30 kg/m²       Assessment:      Keiko Lange was seen today for follow-up from hospital, depression and pulmonary nodule. Diagnoses and all orders for this visit:    Need for influenza vaccination  -     INFLUENZA, QUADV, 3 YRS AND OLDER, IM, MDV, 0.5ML (FLUZONE QUADV)    Situational stress  -     sertraline (ZOLOFT) 25 MG tablet; Take 1 tablet by mouth daily  -     doxepin (SINEQUAN) 10 MG capsule; Take 1 capsule by mouth nightly as needed (sleep)    Abnormal chest xray  -     XR CHEST STANDARD (2 VW); Future           Plan:      Return in about 3 months (around 12/18/2018). There are no Patient Instructions on file for this visit.          Sarah Beth Branch MA

## 2018-09-19 ASSESSMENT — ENCOUNTER SYMPTOMS: RESPIRATORY NEGATIVE: 1

## 2018-09-24 DIAGNOSIS — F43.9 SITUATIONAL STRESS: ICD-10-CM

## 2018-09-24 RX ORDER — DOXEPIN HYDROCHLORIDE 10 MG/1
10 CAPSULE ORAL NIGHTLY PRN
Qty: 15 CAPSULE | Refills: 0 | Status: SHIPPED | OUTPATIENT
Start: 2018-09-24 | End: 2019-05-01

## 2018-10-01 ENCOUNTER — HOSPITAL ENCOUNTER (OUTPATIENT)
Dept: GENERAL RADIOLOGY | Age: 23
Discharge: HOME | End: 2018-10-01
Payer: COMMERCIAL

## 2018-10-01 ENCOUNTER — HOSPITAL ENCOUNTER (OUTPATIENT)
Age: 23
Discharge: HOME | End: 2018-10-01
Payer: COMMERCIAL

## 2018-10-01 DIAGNOSIS — R93.89 ABNORMAL CHEST XRAY: ICD-10-CM

## 2018-10-01 PROCEDURE — 71046 X-RAY EXAM CHEST 2 VIEWS: CPT

## 2018-10-26 ENCOUNTER — OFFICE VISIT (OUTPATIENT)
Dept: FAMILY MEDICINE CLINIC | Age: 23
End: 2018-10-26
Payer: COMMERCIAL

## 2018-10-26 VITALS
SYSTOLIC BLOOD PRESSURE: 127 MMHG | BODY MASS INDEX: 20.33 KG/M2 | TEMPERATURE: 99 F | DIASTOLIC BLOOD PRESSURE: 81 MMHG | HEIGHT: 65 IN | WEIGHT: 122 LBS | HEART RATE: 100 BPM | OXYGEN SATURATION: 100 %

## 2018-10-26 DIAGNOSIS — J40 BRONCHITIS: Primary | ICD-10-CM

## 2018-10-26 PROCEDURE — 99214 OFFICE O/P EST MOD 30 MIN: CPT | Performed by: NURSE PRACTITIONER

## 2018-10-26 RX ORDER — AZITHROMYCIN 250 MG/1
250 TABLET, FILM COATED ORAL DAILY
Qty: 6 TABLET | Refills: 0 | Status: SHIPPED | OUTPATIENT
Start: 2018-10-26 | End: 2019-05-01 | Stop reason: ALTCHOICE

## 2018-10-26 RX ORDER — BENZONATATE 100 MG/1
100 CAPSULE ORAL 3 TIMES DAILY PRN
Qty: 42 CAPSULE | Refills: 0 | Status: SHIPPED | OUTPATIENT
Start: 2018-10-26 | End: 2018-11-09

## 2018-10-26 RX ORDER — GUAIFENESIN 600 MG/1
600 TABLET, EXTENDED RELEASE ORAL 2 TIMES DAILY
Qty: 30 TABLET | Refills: 0 | Status: SHIPPED | OUTPATIENT
Start: 2018-10-26 | End: 2021-09-07

## 2018-10-26 ASSESSMENT — ENCOUNTER SYMPTOMS
SORE THROAT: 1
VOMITING: 0
CONSTIPATION: 0
DIARRHEA: 0
SINUS PRESSURE: 0
SHORTNESS OF BREATH: 0
ABDOMINAL PAIN: 0
CHEST TIGHTNESS: 0
WHEEZING: 0
COUGH: 1
NAUSEA: 0

## 2018-10-26 NOTE — PROGRESS NOTES
[facility]  [address] [address]  Dept: [phone]  Dept Fax: [phone]    Laila Grant is a 20 y.o. female who presents today for hermedical conditions/complaints as noted below. Laila Grant is c/o of Pharyngitis (started 10/22/18 ); Cough; Fever; and Generalized Body Aches    Chief Complaint   Patient presents with    Pharyngitis     started 10/22/18     Cough    Fever    Generalized Body Aches     HPI:     Pharyngitis   This is a new problem. The current episode started in the past 7 days. The problem occurs constantly. The problem has been unchanged. Associated symptoms include arthralgias, chills, congestion, coughing, fatigue, a fever, a sore throat and weakness. Pertinent negatives include no abdominal pain, anorexia, chest pain, joint swelling, myalgias, nausea, neck pain, numbness, rash, urinary symptoms or vomiting. The symptoms are aggravated by eating (Activity). She has tried acetaminophen (Dayquil, cough drops) for the symptoms. The treatment provided no relief. Subjective:     Review of Systems   Constitutional: Positive for chills, fatigue and fever. Negative for appetite change and unexpected weight change. HENT: Positive for congestion and sore throat. Negative for sinus pressure. Respiratory: Positive for cough. Negative for chest tightness, shortness of breath and wheezing. Cardiovascular: Negative for chest pain, palpitations and leg swelling. Gastrointestinal: Negative for abdominal pain, anorexia, constipation, diarrhea, nausea and vomiting. Genitourinary: Negative for decreased urine volume, difficulty urinating and frequency. Musculoskeletal: Positive for arthralgias. Negative for joint swelling, myalgias and neck pain. Skin: Negative for rash. Neurological: Positive for weakness. Negative for dizziness, light-headedness and numbness.      Objective:     Vitals:    10/26/18 1507   BP:

## 2018-11-01 ENCOUNTER — TELEPHONE (OUTPATIENT)
Dept: FAMILY MEDICINE CLINIC | Age: 23
End: 2018-11-01

## 2018-11-01 DIAGNOSIS — R05.3 PERSISTENT COUGH FOR 3 WEEKS OR LONGER: Primary | ICD-10-CM

## 2018-11-01 DIAGNOSIS — R05.3 PERSISTENT COUGH FOR 3 WEEKS OR LONGER: ICD-10-CM

## 2018-11-01 RX ORDER — DOXYCYCLINE HYCLATE 100 MG/1
100 CAPSULE ORAL 2 TIMES DAILY
Qty: 14 CAPSULE | Refills: 0 | Status: SHIPPED | OUTPATIENT
Start: 2018-11-01 | End: 2018-11-01 | Stop reason: SDUPTHER

## 2018-11-01 RX ORDER — DOXYCYCLINE HYCLATE 100 MG/1
100 CAPSULE ORAL 2 TIMES DAILY
Qty: 14 CAPSULE | Refills: 0 | Status: SHIPPED | OUTPATIENT
Start: 2018-11-01 | End: 2018-11-08

## 2019-05-01 ENCOUNTER — OFFICE VISIT (OUTPATIENT)
Dept: FAMILY MEDICINE CLINIC | Age: 24
End: 2019-05-01
Payer: COMMERCIAL

## 2019-05-01 VITALS
HEART RATE: 93 BPM | OXYGEN SATURATION: 100 % | WEIGHT: 129 LBS | SYSTOLIC BLOOD PRESSURE: 103 MMHG | DIASTOLIC BLOOD PRESSURE: 68 MMHG | BODY MASS INDEX: 21.47 KG/M2

## 2019-05-01 DIAGNOSIS — F43.9 SITUATIONAL STRESS: Primary | ICD-10-CM

## 2019-05-01 PROCEDURE — G8420 CALC BMI NORM PARAMETERS: HCPCS | Performed by: FAMILY MEDICINE

## 2019-05-01 PROCEDURE — G8427 DOCREV CUR MEDS BY ELIG CLIN: HCPCS | Performed by: FAMILY MEDICINE

## 2019-05-01 PROCEDURE — 99213 OFFICE O/P EST LOW 20 MIN: CPT | Performed by: FAMILY MEDICINE

## 2019-05-01 PROCEDURE — 1036F TOBACCO NON-USER: CPT | Performed by: FAMILY MEDICINE

## 2019-05-01 RX ORDER — VENLAFAXINE HYDROCHLORIDE 75 MG/1
CAPSULE, EXTENDED RELEASE ORAL
Qty: 60 CAPSULE | Refills: 1 | Status: SHIPPED | OUTPATIENT
Start: 2019-05-01 | End: 2019-10-08 | Stop reason: DRUGHIGH

## 2019-05-01 ASSESSMENT — PATIENT HEALTH QUESTIONNAIRE - PHQ9
SUM OF ALL RESPONSES TO PHQ QUESTIONS 1-9: 2
SUM OF ALL RESPONSES TO PHQ9 QUESTIONS 1 & 2: 2
1. LITTLE INTEREST OR PLEASURE IN DOING THINGS: 1
2. FEELING DOWN, DEPRESSED OR HOPELESS: 1
SUM OF ALL RESPONSES TO PHQ QUESTIONS 1-9: 2

## 2019-05-01 NOTE — PROGRESS NOTES
SpO2 100%   BMI 21.47 kg/m²     Assessment:      Phoebe Cardenas was seen today for depression, fatigue and anxiety. Diagnoses and all orders for this visit:    Situational stress  -     venlafaxine (EFFEXOR XR) 75 MG extended release capsule; Take one daily for the first week. Then 2 daily at one time           Plan:       Return in about 6 weeks (around 6/12/2019).           Miguel Lorenzana MA

## 2019-05-02 ASSESSMENT — ENCOUNTER SYMPTOMS
RESPIRATORY NEGATIVE: 1
GASTROINTESTINAL NEGATIVE: 1

## 2019-06-26 ENCOUNTER — OFFICE VISIT (OUTPATIENT)
Dept: FAMILY MEDICINE CLINIC | Age: 24
End: 2019-06-26
Payer: COMMERCIAL

## 2019-06-26 VITALS
OXYGEN SATURATION: 100 % | DIASTOLIC BLOOD PRESSURE: 82 MMHG | HEIGHT: 65 IN | WEIGHT: 129 LBS | SYSTOLIC BLOOD PRESSURE: 118 MMHG | BODY MASS INDEX: 21.49 KG/M2 | HEART RATE: 103 BPM

## 2019-06-26 DIAGNOSIS — F43.9 SITUATIONAL STRESS: Primary | ICD-10-CM

## 2019-06-26 PROCEDURE — 99213 OFFICE O/P EST LOW 20 MIN: CPT | Performed by: FAMILY MEDICINE

## 2019-06-26 PROCEDURE — G8427 DOCREV CUR MEDS BY ELIG CLIN: HCPCS | Performed by: FAMILY MEDICINE

## 2019-06-26 PROCEDURE — G8420 CALC BMI NORM PARAMETERS: HCPCS | Performed by: FAMILY MEDICINE

## 2019-06-26 PROCEDURE — 1036F TOBACCO NON-USER: CPT | Performed by: FAMILY MEDICINE

## 2019-06-26 RX ORDER — VENLAFAXINE HYDROCHLORIDE 150 MG/1
150 CAPSULE, EXTENDED RELEASE ORAL DAILY
Qty: 90 CAPSULE | Refills: 1 | Status: SHIPPED | OUTPATIENT
Start: 2019-06-26 | End: 2019-10-08 | Stop reason: DRUGHIGH

## 2019-06-26 RX ORDER — SPIRONOLACTONE 100 MG/1
100 TABLET, FILM COATED ORAL DAILY
COMMUNITY
End: 2019-11-20

## 2019-08-13 RX ORDER — LEVONORGESTREL AND ETHINYL ESTRADIOL 0.15-0.03
1 KIT ORAL DAILY
Qty: 91 TABLET | Refills: 3 | Status: SHIPPED | OUTPATIENT
Start: 2019-08-13 | End: 2019-11-20 | Stop reason: SDUPTHER

## 2019-09-13 ENCOUNTER — HOSPITAL ENCOUNTER (OUTPATIENT)
Dept: VASCULAR LAB | Age: 24
Discharge: HOME | End: 2019-09-13
Payer: COMMERCIAL

## 2019-09-13 PROCEDURE — 93971 EXTREMITY STUDY: CPT

## 2019-10-08 ENCOUNTER — OFFICE VISIT (OUTPATIENT)
Dept: FAMILY MEDICINE CLINIC | Age: 24
End: 2019-10-08
Payer: COMMERCIAL

## 2019-10-08 VITALS
DIASTOLIC BLOOD PRESSURE: 82 MMHG | BODY MASS INDEX: 22.33 KG/M2 | HEART RATE: 112 BPM | WEIGHT: 134 LBS | TEMPERATURE: 98.2 F | HEIGHT: 65 IN | OXYGEN SATURATION: 100 % | SYSTOLIC BLOOD PRESSURE: 122 MMHG

## 2019-10-08 DIAGNOSIS — F43.9 SITUATIONAL STRESS: ICD-10-CM

## 2019-10-08 DIAGNOSIS — Z00.01 ENCOUNTER FOR WELL ADULT EXAM WITH ABNORMAL FINDINGS: ICD-10-CM

## 2019-10-08 DIAGNOSIS — Z23 NEED FOR INFLUENZA VACCINATION: Primary | ICD-10-CM

## 2019-10-08 DIAGNOSIS — Z23 NEED FOR HPV VACCINATION: ICD-10-CM

## 2019-10-08 LAB
HCT VFR BLD CALC: 40.5 % (ref 36–48)
HEMOGLOBIN: 13.6 G/DL (ref 12–16)
MCH RBC QN AUTO: 31.2 PG (ref 26–34)
MCHC RBC AUTO-ENTMCNC: 33.6 G/DL (ref 31–36)
MCV RBC AUTO: 92.9 FL (ref 80–100)
PDW BLD-RTO: 12.8 % (ref 12.4–15.4)
PLATELET # BLD: 316 K/UL (ref 135–450)
PMV BLD AUTO: 9.2 FL (ref 5–10.5)
RBC # BLD: 4.37 M/UL (ref 4–5.2)
WBC # BLD: 4.5 K/UL (ref 4–11)

## 2019-10-08 PROCEDURE — G8482 FLU IMMUNIZE ORDER/ADMIN: HCPCS | Performed by: FAMILY MEDICINE

## 2019-10-08 PROCEDURE — 36415 COLL VENOUS BLD VENIPUNCTURE: CPT | Performed by: FAMILY MEDICINE

## 2019-10-08 PROCEDURE — 90651 9VHPV VACCINE 2/3 DOSE IM: CPT | Performed by: FAMILY MEDICINE

## 2019-10-08 PROCEDURE — 90471 IMMUNIZATION ADMIN: CPT | Performed by: FAMILY MEDICINE

## 2019-10-08 PROCEDURE — 90472 IMMUNIZATION ADMIN EACH ADD: CPT | Performed by: FAMILY MEDICINE

## 2019-10-08 PROCEDURE — 90688 IIV4 VACCINE SPLT 0.5 ML IM: CPT | Performed by: FAMILY MEDICINE

## 2019-10-08 PROCEDURE — 99395 PREV VISIT EST AGE 18-39: CPT | Performed by: FAMILY MEDICINE

## 2019-10-08 RX ORDER — VENLAFAXINE HYDROCHLORIDE 75 MG/1
75 CAPSULE, EXTENDED RELEASE ORAL DAILY
Qty: 30 CAPSULE | Refills: 3 | Status: SHIPPED | OUTPATIENT
Start: 2019-10-08 | End: 2020-03-02 | Stop reason: SDUPTHER

## 2019-10-09 LAB
ALBUMIN SERPL-MCNC: 5.2 G/DL (ref 3.4–5)
ANION GAP SERPL CALCULATED.3IONS-SCNC: 17 MMOL/L (ref 3–16)
BUN BLDV-MCNC: 6 MG/DL (ref 7–20)
CALCIUM SERPL-MCNC: 9.9 MG/DL (ref 8.3–10.6)
CHLORIDE BLD-SCNC: 98 MMOL/L (ref 99–110)
CHOLESTEROL, TOTAL: 242 MG/DL (ref 0–199)
CO2: 24 MMOL/L (ref 21–32)
CREAT SERPL-MCNC: <0.5 MG/DL (ref 0.6–1.1)
GFR AFRICAN AMERICAN: >60
GFR NON-AFRICAN AMERICAN: >60
GLUCOSE BLD-MCNC: 89 MG/DL (ref 70–99)
HDLC SERPL-MCNC: 74 MG/DL (ref 40–60)
LDL CHOLESTEROL CALCULATED: 155 MG/DL
PHOSPHORUS: 4.3 MG/DL (ref 2.5–4.9)
POTASSIUM SERPL-SCNC: 4 MMOL/L (ref 3.5–5.1)
SODIUM BLD-SCNC: 139 MMOL/L (ref 136–145)
TRIGL SERPL-MCNC: 65 MG/DL (ref 0–150)
VLDLC SERPL CALC-MCNC: 13 MG/DL

## 2019-11-11 ENCOUNTER — NURSE ONLY (OUTPATIENT)
Dept: FAMILY MEDICINE CLINIC | Age: 24
End: 2019-11-11
Payer: COMMERCIAL

## 2019-11-11 DIAGNOSIS — Z23 NEED FOR HPV VACCINATION: Primary | ICD-10-CM

## 2019-11-11 PROCEDURE — 90651 9VHPV VACCINE 2/3 DOSE IM: CPT | Performed by: FAMILY MEDICINE

## 2019-11-11 PROCEDURE — 90471 IMMUNIZATION ADMIN: CPT | Performed by: FAMILY MEDICINE

## 2019-11-20 ENCOUNTER — OFFICE VISIT (OUTPATIENT)
Dept: OBGYN CLINIC | Age: 24
End: 2019-11-20
Payer: COMMERCIAL

## 2019-11-20 VITALS
BODY MASS INDEX: 22.66 KG/M2 | HEART RATE: 113 BPM | SYSTOLIC BLOOD PRESSURE: 122 MMHG | HEIGHT: 65 IN | DIASTOLIC BLOOD PRESSURE: 72 MMHG | WEIGHT: 136 LBS | TEMPERATURE: 98.2 F

## 2019-11-20 DIAGNOSIS — Z01.419 WOMEN'S ANNUAL ROUTINE GYNECOLOGICAL EXAMINATION: Primary | ICD-10-CM

## 2019-11-20 DIAGNOSIS — Z79.3 ON ORAL CONTRACEPTIVE PILLS FOR NON-CONTRACEPTION INDICATION: ICD-10-CM

## 2019-11-20 DIAGNOSIS — Z87.59: ICD-10-CM

## 2019-11-20 DIAGNOSIS — N92.1 METRORRHAGIA: ICD-10-CM

## 2019-11-20 DIAGNOSIS — Z87.442: ICD-10-CM

## 2019-11-20 PROCEDURE — G8482 FLU IMMUNIZE ORDER/ADMIN: HCPCS | Performed by: OBSTETRICS & GYNECOLOGY

## 2019-11-20 PROCEDURE — 99395 PREV VISIT EST AGE 18-39: CPT | Performed by: OBSTETRICS & GYNECOLOGY

## 2019-11-20 RX ORDER — LEVONORGESTREL AND ETHINYL ESTRADIOL 0.15-0.03
1 KIT ORAL DAILY
Qty: 91 TABLET | Refills: 3 | Status: SHIPPED | OUTPATIENT
Start: 2019-11-20 | End: 2020-12-07

## 2019-11-21 ASSESSMENT — ENCOUNTER SYMPTOMS
ABDOMINAL DISTENTION: 0
NAUSEA: 0
SHORTNESS OF BREATH: 0
CONSTIPATION: 0
DIARRHEA: 0
ABDOMINAL PAIN: 0
VOMITING: 0

## 2019-12-05 ENCOUNTER — OFFICE VISIT (OUTPATIENT)
Dept: FAMILY MEDICINE CLINIC | Age: 24
End: 2019-12-05
Payer: COMMERCIAL

## 2019-12-05 VITALS
WEIGHT: 136 LBS | HEART RATE: 79 BPM | HEIGHT: 65 IN | SYSTOLIC BLOOD PRESSURE: 102 MMHG | BODY MASS INDEX: 22.66 KG/M2 | DIASTOLIC BLOOD PRESSURE: 74 MMHG | OXYGEN SATURATION: 99 %

## 2019-12-05 DIAGNOSIS — E04.9 GOITER: Primary | ICD-10-CM

## 2019-12-05 LAB — TSH REFLEX: 1.25 UIU/ML (ref 0.27–4.2)

## 2019-12-05 PROCEDURE — 99213 OFFICE O/P EST LOW 20 MIN: CPT | Performed by: FAMILY MEDICINE

## 2019-12-05 PROCEDURE — G8427 DOCREV CUR MEDS BY ELIG CLIN: HCPCS | Performed by: FAMILY MEDICINE

## 2019-12-05 PROCEDURE — G8420 CALC BMI NORM PARAMETERS: HCPCS | Performed by: FAMILY MEDICINE

## 2019-12-05 PROCEDURE — 1036F TOBACCO NON-USER: CPT | Performed by: FAMILY MEDICINE

## 2019-12-05 PROCEDURE — 36415 COLL VENOUS BLD VENIPUNCTURE: CPT | Performed by: FAMILY MEDICINE

## 2019-12-05 PROCEDURE — G8482 FLU IMMUNIZE ORDER/ADMIN: HCPCS | Performed by: FAMILY MEDICINE

## 2019-12-10 ENCOUNTER — HOSPITAL ENCOUNTER (OUTPATIENT)
Dept: ULTRASOUND IMAGING | Age: 24
Discharge: HOME | End: 2019-12-10
Payer: COMMERCIAL

## 2019-12-10 DIAGNOSIS — E04.9 GOITER: ICD-10-CM

## 2019-12-10 PROCEDURE — 76536 US EXAM OF HEAD AND NECK: CPT

## 2020-03-02 RX ORDER — VENLAFAXINE HYDROCHLORIDE 75 MG/1
75 CAPSULE, EXTENDED RELEASE ORAL DAILY
Qty: 30 CAPSULE | Refills: 3 | Status: SHIPPED | OUTPATIENT
Start: 2020-03-02 | End: 2020-08-13

## 2020-03-11 ENCOUNTER — TELEPHONE (OUTPATIENT)
Dept: FAMILY MEDICINE CLINIC | Age: 25
End: 2020-03-11

## 2020-03-17 ENCOUNTER — TELEPHONE (OUTPATIENT)
Dept: FAMILY MEDICINE CLINIC | Age: 25
End: 2020-03-17

## 2020-03-17 NOTE — TELEPHONE ENCOUNTER
Patient has apt on 4/6 for her 3rd HPV vaccine. Need to reschedule at another location. Maeve So has agreed to give the injection there. Left message for patient to return my call.

## 2020-04-20 ENCOUNTER — NURSE ONLY (OUTPATIENT)
Dept: FAMILY MEDICINE CLINIC | Age: 25
End: 2020-04-20
Payer: COMMERCIAL

## 2020-04-20 PROCEDURE — 90651 9VHPV VACCINE 2/3 DOSE IM: CPT | Performed by: NURSE PRACTITIONER

## 2020-04-20 PROCEDURE — 90471 IMMUNIZATION ADMIN: CPT | Performed by: NURSE PRACTITIONER

## 2020-06-04 ENCOUNTER — TELEPHONE (OUTPATIENT)
Dept: FAMILY MEDICINE CLINIC | Age: 25
End: 2020-06-04

## 2020-06-29 ENCOUNTER — TELEPHONE (OUTPATIENT)
Dept: FAMILY MEDICINE CLINIC | Age: 25
End: 2020-06-29

## 2020-08-13 ENCOUNTER — OFFICE VISIT (OUTPATIENT)
Dept: FAMILY MEDICINE CLINIC | Age: 25
End: 2020-08-13
Payer: COMMERCIAL

## 2020-08-13 VITALS
SYSTOLIC BLOOD PRESSURE: 100 MMHG | HEIGHT: 65 IN | DIASTOLIC BLOOD PRESSURE: 72 MMHG | TEMPERATURE: 97.7 F | OXYGEN SATURATION: 99 % | BODY MASS INDEX: 23.26 KG/M2 | HEART RATE: 81 BPM | WEIGHT: 139.6 LBS

## 2020-08-13 LAB
A/G RATIO: 1.6 (ref 1.1–2.2)
ALBUMIN SERPL-MCNC: 4.2 G/DL (ref 3.4–5)
ALP BLD-CCNC: 66 U/L (ref 40–129)
ALT SERPL-CCNC: 8 U/L (ref 10–40)
ANION GAP SERPL CALCULATED.3IONS-SCNC: 10 MMOL/L (ref 3–16)
AST SERPL-CCNC: 12 U/L (ref 15–37)
BILIRUB SERPL-MCNC: <0.2 MG/DL (ref 0–1)
BUN BLDV-MCNC: 7 MG/DL (ref 7–20)
CALCIUM SERPL-MCNC: 8.9 MG/DL (ref 8.3–10.6)
CHLORIDE BLD-SCNC: 103 MMOL/L (ref 99–110)
CHOLESTEROL, TOTAL: 258 MG/DL (ref 0–199)
CO2: 24 MMOL/L (ref 21–32)
CREAT SERPL-MCNC: 0.6 MG/DL (ref 0.6–1.1)
GFR AFRICAN AMERICAN: >60
GFR NON-AFRICAN AMERICAN: >60
GLOBULIN: 2.7 G/DL
GLUCOSE BLD-MCNC: 88 MG/DL (ref 70–99)
HDLC SERPL-MCNC: 64 MG/DL (ref 40–60)
LDL CHOLESTEROL CALCULATED: 183 MG/DL
POTASSIUM SERPL-SCNC: 3.9 MMOL/L (ref 3.5–5.1)
SODIUM BLD-SCNC: 137 MMOL/L (ref 136–145)
TOTAL PROTEIN: 6.9 G/DL (ref 6.4–8.2)
TRIGL SERPL-MCNC: 57 MG/DL (ref 0–150)
VLDLC SERPL CALC-MCNC: 11 MG/DL

## 2020-08-13 PROCEDURE — 99214 OFFICE O/P EST MOD 30 MIN: CPT | Performed by: NURSE PRACTITIONER

## 2020-08-13 PROCEDURE — 36415 COLL VENOUS BLD VENIPUNCTURE: CPT | Performed by: NURSE PRACTITIONER

## 2020-08-13 PROCEDURE — 1036F TOBACCO NON-USER: CPT | Performed by: NURSE PRACTITIONER

## 2020-08-13 PROCEDURE — G8427 DOCREV CUR MEDS BY ELIG CLIN: HCPCS | Performed by: NURSE PRACTITIONER

## 2020-08-13 PROCEDURE — G8420 CALC BMI NORM PARAMETERS: HCPCS | Performed by: NURSE PRACTITIONER

## 2020-08-13 RX ORDER — VENLAFAXINE HYDROCHLORIDE 37.5 MG/1
37.5 CAPSULE, EXTENDED RELEASE ORAL DAILY
Qty: 28 CAPSULE | Refills: 0 | Status: SHIPPED | OUTPATIENT
Start: 2020-08-13 | End: 2020-09-10 | Stop reason: ALTCHOICE

## 2020-08-13 RX ORDER — HYDROXYZINE HYDROCHLORIDE 25 MG/1
25 TABLET, FILM COATED ORAL EVERY 8 HOURS PRN
Qty: 30 TABLET | Refills: 0 | Status: SHIPPED | OUTPATIENT
Start: 2020-08-13 | End: 2021-08-18 | Stop reason: SDUPTHER

## 2020-08-13 ASSESSMENT — ENCOUNTER SYMPTOMS
EYE PAIN: 0
BACK PAIN: 0
SINUS PAIN: 0
SORE THROAT: 0
TROUBLE SWALLOWING: 0
NAUSEA: 0
VOMITING: 0
SHORTNESS OF BREATH: 0
WHEEZING: 0
EYE ITCHING: 0
COUGH: 0
BLOOD IN STOOL: 0
DIARRHEA: 0
EYE REDNESS: 0
STRIDOR: 0
EYE DISCHARGE: 0
RHINORRHEA: 0
COLOR CHANGE: 0
CONSTIPATION: 0
PHOTOPHOBIA: 0
VOICE CHANGE: 0
SINUS PRESSURE: 0
CHOKING: 0
CHEST TIGHTNESS: 0
ABDOMINAL PAIN: 0

## 2020-08-13 ASSESSMENT — PATIENT HEALTH QUESTIONNAIRE - PHQ9
SUM OF ALL RESPONSES TO PHQ QUESTIONS 1-9: 0
SUM OF ALL RESPONSES TO PHQ QUESTIONS 1-9: 0
2. FEELING DOWN, DEPRESSED OR HOPELESS: 0
SUM OF ALL RESPONSES TO PHQ9 QUESTIONS 1 & 2: 0
1. LITTLE INTEREST OR PLEASURE IN DOING THINGS: 0

## 2020-08-13 NOTE — PROGRESS NOTES
2020     Delmar Reyes (:  1995) is a 30 y.o. female, here for evaluation of the following medical concerns:    HPI    This patient is new to the practice and is here to establish care. The patients prior PCP was Dr. Wesly Gallardo. We reviewed the patients allergies and current medications. We reviewed the patients medical, surgical and family history. Anxiety:     She is wanting to get off of her Effexor. She is tired and having strange dreams. She has tried Zoloft in the past and this was not effective for her. She has trouble staying asleep. Will wean her off Effexor. Will have her take 37.5 mg for 3 weeks then off medication 2 weeks then consider starting Buspar vs Zoloft. She will consider Laurier Landau with Telehealth as well. During the visit she is wringing her hands constantly. Physical for her work. She works at [employer]. We will do this today. Pap: States she try to have a Pap done in the past and panic attack on the table and has been unable to get this ever since    Review of Systems   Constitutional: Negative for activity change, appetite change, chills, diaphoresis, fatigue, fever and unexpected weight change. HENT: Negative for congestion, ear discharge, ear pain, hearing loss, nosebleeds, postnasal drip, rhinorrhea, sinus pressure, sinus pain, sneezing, sore throat, tinnitus, trouble swallowing and voice change. Eyes: Negative for photophobia, pain, discharge, redness and itching. Respiratory: Negative for cough, choking, chest tightness, shortness of breath, wheezing and stridor. Cardiovascular: Negative for chest pain, palpitations and leg swelling. Gastrointestinal: Negative for abdominal pain, blood in stool, constipation, diarrhea, nausea and vomiting. Endocrine: Negative for cold intolerance, heat intolerance, polydipsia and polyuria.    Genitourinary: Negative for difficulty urinating, dysuria, enuresis, flank pain, frequency, hematuria well-developed. HENT:      Head: Normocephalic and atraumatic. Right Ear: Hearing, tympanic membrane, ear canal and external ear normal.      Left Ear: Hearing, tympanic membrane, ear canal and external ear normal.      Nose: Nose normal.      Right Sinus: No maxillary sinus tenderness or frontal sinus tenderness. Left Sinus: No maxillary sinus tenderness or frontal sinus tenderness. Mouth/Throat:      Pharynx: No oropharyngeal exudate. Eyes:      General:         Right eye: No discharge. Left eye: No discharge. Conjunctiva/sclera: Conjunctivae normal.      Pupils: Pupils are equal, round, and reactive to light. Neck:      Musculoskeletal: Normal range of motion. Thyroid: No thyromegaly. Vascular: No JVD. Trachea: No tracheal deviation. Cardiovascular:      Rate and Rhythm: Normal rate and regular rhythm. Heart sounds: Normal heart sounds. No murmur. No friction rub. Pulmonary:      Effort: Pulmonary effort is normal. No respiratory distress. Breath sounds: Normal breath sounds. No stridor. No decreased breath sounds, wheezing, rhonchi or rales. Musculoskeletal: Normal range of motion. General: No tenderness. Lymphadenopathy:      Cervical: No cervical adenopathy. Skin:     General: Skin is warm and dry. Capillary Refill: Capillary refill takes less than 2 seconds. Findings: No rash. Neurological:      Mental Status: She is alert and oriented to person, place, and time. Sensory: Sensation is intact. Motor: Motor function is intact. Coordination: Coordination normal.   Psychiatric:         Attention and Perception: Attention and perception normal.         Mood and Affect: Mood is anxious. Speech: Speech normal.         Behavior: Behavior normal. Behavior is cooperative. Thought Content:  Thought content normal.         Cognition and Memory: Cognition normal.         Judgment: Judgment normal. ASSESSMENT/PLAN:  1. Establishing care with new doctor, encounter for    - LIPID PANEL  - COMPREHENSIVE METABOLIC PANEL  - hydrOXYzine (ATARAX) 25 MG tablet; Take 1 tablet by mouth every 8 hours as needed for Anxiety  Dispense: 30 tablet; Refill: 0  - venlafaxine (EFFEXOR XR) 37.5 MG extended release capsule; Take 1 capsule by mouth daily for 28 days  Dispense: 28 capsule; Refill: 0    2. Anxiety    - hydrOXYzine (ATARAX) 25 MG tablet; Take 1 tablet by mouth every 8 hours as needed for Anxiety  Dispense: 30 tablet; Refill: 0  - venlafaxine (EFFEXOR XR) 37.5 MG extended release capsule; Take 1 capsule by mouth daily for 28 days  Dispense: 28 capsule; Refill: 0    3. Encounter for annual physical exam    - LIPID PANEL  - COMPREHENSIVE METABOLIC PANEL    4. Situational stress    - venlafaxine (EFFEXOR XR) 37.5 MG extended release capsule; Take 1 capsule by mouth daily for 28 days  Dispense: 28 capsule; Refill: 0    Follow up in 4 weeks to discuss anxiety medication. Follow up if symptoms do not improve or worsen. If the patient becomes short of breath go straight to the ER or call 911. Return in about 4 weeks (around 9/10/2020). An electronic signature was used to authenticate this note.     --NAVARRO Barragan - CNP on 8/13/2020 at 11:14 AM

## 2020-08-14 RX ORDER — ATORVASTATIN CALCIUM 10 MG/1
10 TABLET, FILM COATED ORAL DAILY
Qty: 90 TABLET | Refills: 1 | Status: SHIPPED | OUTPATIENT
Start: 2020-08-14 | End: 2021-09-09 | Stop reason: SDUPTHER

## 2020-09-01 ENCOUNTER — VIRTUAL VISIT (OUTPATIENT)
Dept: PSYCHOLOGY | Age: 25
End: 2020-09-01
Payer: COMMERCIAL

## 2020-09-01 PROCEDURE — 90791 PSYCH DIAGNOSTIC EVALUATION: CPT | Performed by: SOCIAL WORKER

## 2020-09-01 NOTE — PROGRESS NOTES
Behavioral Health Consultation  Giselee Smoker. SULLY Samayoa-S  9/1/2020  11:04 AM EDT      Time spent with Patient: 30 minutes  [facility]. Reason for Consult:    Chief Complaint   Patient presents with    Anxiety    Depression     Referring Provider: Suann Farmington, APRN - CNP  [address]  [place], [address]    Pt provided informed consent for the behavioral health program. Discussed with patient model of service to include the limits of confidentiality (i.e. abuse reporting, suicide intervention, etc.) and short-term intervention focused approach. Pt indicated understanding. Feedback given to PCP. TELEHEALTH VISIT -- Audio/Visual (During [facility]-26 public health emergency)  }  Pursuant to the emergency declaration under the [facility] waiver authority and the [employer] Act, this Virtual Visit was conducted, with patient's consent, to reduce the patient's risk of exposure to COVID-19 and provide continuity of care for an established patient. Services were provided through a video synchronous discussion virtually to substitute for in-person clinic visit. Pt gave verbal informed consent to participate in telehealth services. Conducted a risk-benefit analysis and determined that the patient's presenting problems are consistent with the use of telepsychology. Determined that the patient has sufficient knowledge and skills in the use of technology enabling them to adequately benefit from telepsychology. It was determined that this patient was able to be properly treated without an in-person session. Patient verified that they were currently located at the [facility] address that was provided during registration.     Verified the following information:  Patient's identification: Yes  Patient location: [address] [place] [id number]  Patient's call back number: [phone] Patient's emergency contact's name and number, as well as permission to contact them if needed: Extended Emergency Contact Information  Primary Emergency Contact: [facility]  Address: Rudy Ferreira [address]  [address] Phone: [phone]  Relation: Parent  Secondary Emergency Contact: Yulisa Maciel  Address: Rudy Ferreira [address]  [address] Phone: [phone]  Mobile Phone: [phone]  Relation: Parent     Provider location: Balwinder Layman:  Pt endorses that she was taking 150mg of effexor, thought this was making her have bizarre dreams. Pt has been struggling with her mother, she makes patient very anxious. Pt lives with her fiance, this causes a lot of issues with her mother who is very Denominational and not in agreement with them living together. She feels her mother is very extreme when it comes to Jainism. Her father seems to just go along with whoever to avoid conflict. Pt is an only child. She went to a therapist a couple years ago for stress, therapist asked her to bring her mother into the appt and her mother really embarrassed her. Pt lost her uncle by way of suicide. She has threatened to jump off an overpass just like pt;s uncle. She has deal with this kind of behavior throughout her life. Pt is set to get  next sept. Her fiance is an atheist, also has been  and has an 6 yr old son. She gets along very well with 6 yr old. Pt is struggling with increased depression and anxiety. She is feeling very exhausted and doesn't want to leave the house. She struggled with a lot of anxiety in high school. She is bothered by crowds, or struggles waiting in line at grocery stores. No hx of trauma. No physical violence, mother was just verbally abusive. No drug or etoh use. No SI/HI.         O:  MSE:    Appearance: good hygiene   Attitude: cooperative and friendly  Consciousness: alert  Orientation: oriented to person, place, time, general circumstance  Memory: recent and remote memory intact  Attention/Concentration: intact during session  Psychomotor Activity:normal  Eye Contact: normal  Speech: normal rate and volume, well-articulated  Mood: depressed and anxious  Affect: congruent  Perception: within normal limits  Thought Content: within normal limits  Thought Process: logical, coherent and goal-directed  Insight: good  Judgment: intact  Ability to understand instructions: Yes  Ability to respond meaningfully: Yes  Morbid Ideation: no   Suicide Assessment: no suicidal ideation, plan, or intent  Homicidal Ideation: no    History:    Medications:   Current Outpatient Medications   Medication Sig Dispense Refill    atorvastatin (LIPITOR) 10 MG tablet Take 1 tablet by mouth daily 90 tablet 1    hydrOXYzine (ATARAX) 25 MG tablet Take 1 tablet by mouth every 8 hours as needed for Anxiety 30 tablet 0    venlafaxine (EFFEXOR XR) 37.5 MG extended release capsule Take 1 capsule by mouth daily for 28 days 28 capsule 0    levonorgestrel-ethinyl estradiol (SEASONALE) 0.15-0.03 MG per tablet Take 1 tablet by mouth daily 91 tablet 3    guaiFENesin (MUCINEX) 600 MG extended release tablet Take 1 tablet by mouth 2 times daily 30 tablet 0     No current facility-administered medications for this visit.       Social History:   Social History     Socioeconomic History    Marital status: Single     Spouse name: Not on file    Number of children: Not on file    Years of education: Not on file    Highest education level: Not on file   Occupational History    Occupation: eligability specialist   Social Needs    Financial resource strain: Not on file    Food insecurity     Worry: Not on file     Inability: Not on file    Transportation needs     Medical: Not on file     Non-medical: Not on file   Tobacco Use    Smoking status: Never Smoker    Smokeless tobacco: Never Used Substance and Sexual Activity    Alcohol use: No    Drug use: No    Sexual activity: Never     Birth control/protection: Pill   Lifestyle    Physical activity     Days per week: Not on file     Minutes per session: Not on file    Stress: Not on file   Relationships    Social connections     Talks on phone: Not on file     Gets together: Not on file     Attends Episcopal service: Not on file     Active member of club or organization: Not on file     Attends meetings of clubs or organizations: Not on file     Relationship status: Not on file    Intimate partner violence     Fear of current or ex partner: Not on file     Emotionally abused: Not on file     Physically abused: Not on file     Forced sexual activity: Not on file   Other Topics Concern    Not on file   Social History Narrative    Not on file     TOBACCO:   reports that she has never smoked. She has never used smokeless tobacco.  ETOH:   reports no history of alcohol use. Family History:   Family History   Problem Relation Age of Onset    Cancer Paternal Uncle 47        pancreatic cancer    Heart Disease Maternal Grandmother         valve replaced    COPD Maternal Grandmother     Coronary Art Dis Maternal Grandmother     Heart Disease Maternal Grandfather     Stroke Paternal Grandfather     Other Maternal Uncle     Other Paternal Aunt        A:  Pt endorses struggles with ongoing depression and anxiety, much she attributes to her mother. Referral to specialty mental health. No SI/HI, insight and motivation good. Diagnosis:    1. ETELVINA (generalized anxiety disorder)    2.  MDD (major depressive disorder), recurrent episode, moderate ([facility])          Diagnosis Date    Acne     Anxiety     Benign mole     [facility] right [facility] bridge[facility]    Hyperlipemia     Hyperlipemia     Kidney stones 2018     Plan:  Pt interventions:  Provided handout on  anxiety, Provided education on the use of medication to treat  depression and anxiety, Trained in strategies for increasing balanced thinking, Discussed and set plan for behavioral activation, Discussed self-care (sleep, nutrition, rewarding activities, social support, exercise), Motivational Interviewing to increase patient confidence and compliance with adhering to behavioral change plan, Motivational Interviewing to determine importance and readiness for change, Established rapport, Supportive techniques and Emphasized self-care as important for managing overall health    Pt Behavioral Change Plan:   See Pt Instructions

## 2020-09-01 NOTE — Clinical Note
Seems like her mother has borderline personality disorder the way she described her. ..so tough. Sent her to specialty mental health with a great therapist. Thanks for the referral she is very sweet.

## 2020-09-01 NOTE — PATIENT INSTRUCTIONS
accelerated heart rate, sweating, shaking, and shortness of breath--largely the product of an influx of stress hormones. Also, muscle pain, tension, and stiffness are common symptoms brought on by stress and anxiety. Relaxation techniques, including PMR, have the reverse effect on the body, eliciting the relaxation response, lowering heart rate, calming the mind, and reducing bodily tension. PMR also can help a person become more aware of how their physical stress may be contributing to their emotional state. By relaxing the body, a person may be able to let go of anxious thoughts and feelings. PMR Step-by-Step  For a quick taste of how PMR works, squeeze one of your fists as hard as you can. Notice how tight your fingers and forearm feel. Count to ten and then release the clinch. Allow your hand to relax completely and let go of any tension. Let your hand go limp and notice how relaxed it feels now compared to before your clinched your fist.       This methodical approach to increasing and releasing tension throughout your body is the linchpin of PMR: By systematically constricting and releasing various muscle groups it is possible to relieve physical stress and quiet and calm the mind. Here are the steps for one version of PMR that anyone can do. Try it next time you're feeling nervous, anxious, or find yourself tossing, turning, and unable to sleep. Step 1    Get comfortable. You don't have to lie down to do PMR; it will work if you're sitting up in a chair. Do make sure you're in a place that's free of distraction. Close your eyes if that feels best for you. Step 2    Breathe. Inhale deeply through your nose, feeling your abdomen rise as you fill your body with air. Then slowly exhale from your mouth, drawing your navel toward your spine. Repeat three to five times. Step 3    Starting with your feet, tighten and release your muscles. Clench your toes and pressing your heels toward the ground. Squeeze tightly for a few breaths and then release. Now flex your feet in, pointing your toes up towards your head. Hold for a few seconds and then release. Step 4    Continue to work your way up your body, tightening and releasing each muscle group. Work your way up in this order: legs, glutes, abdomen, back, hands, arms, shoulders, neck, and face. Try to tighten each muscle group for a few breaths and then slowly release. Repeat any areas that feel especially stiff. Step 5     End the practice by taking a few more deep breaths, noting how much more calm and relaxed you feel. PMR is a skill, one that takes practice to master. In order to be able to draw on PMR when you need it--in other words, when you're truly in a stressful or anxiety-provoking situation--you'll want to learn how to do it while you aren't under pressure. Practice PMR several times a week to become aware of what it's like to feel relaxed. Understanding this feeling can help you to more readily let go of tension when anxiety rises.

## 2020-09-10 ENCOUNTER — OFFICE VISIT (OUTPATIENT)
Dept: FAMILY MEDICINE CLINIC | Age: 25
End: 2020-09-10
Payer: COMMERCIAL

## 2020-09-10 VITALS
TEMPERATURE: 97.5 F | OXYGEN SATURATION: 98 % | WEIGHT: 141.8 LBS | SYSTOLIC BLOOD PRESSURE: 112 MMHG | DIASTOLIC BLOOD PRESSURE: 60 MMHG | HEART RATE: 94 BPM | BODY MASS INDEX: 23.6 KG/M2

## 2020-09-10 PROCEDURE — G8427 DOCREV CUR MEDS BY ELIG CLIN: HCPCS | Performed by: NURSE PRACTITIONER

## 2020-09-10 PROCEDURE — 99214 OFFICE O/P EST MOD 30 MIN: CPT | Performed by: NURSE PRACTITIONER

## 2020-09-10 PROCEDURE — G8420 CALC BMI NORM PARAMETERS: HCPCS | Performed by: NURSE PRACTITIONER

## 2020-09-10 PROCEDURE — 1036F TOBACCO NON-USER: CPT | Performed by: NURSE PRACTITIONER

## 2020-09-10 RX ORDER — SERTRALINE HYDROCHLORIDE 25 MG/1
25 TABLET, FILM COATED ORAL DAILY
Qty: 30 TABLET | Refills: 1 | Status: SHIPPED | OUTPATIENT
Start: 2020-09-24 | End: 2020-10-08 | Stop reason: SDUPTHER

## 2020-09-10 RX ORDER — GLYCOPYRROLATE 1 MG/1
1 TABLET ORAL 2 TIMES DAILY
COMMUNITY
Start: 2020-08-18 | End: 2022-07-07

## 2020-09-10 ASSESSMENT — ENCOUNTER SYMPTOMS
COUGH: 0
BACK PAIN: 0
STRIDOR: 0
CHEST TIGHTNESS: 0
VOICE CHANGE: 0
COLOR CHANGE: 0
DIARRHEA: 0
SORE THROAT: 0
SINUS PAIN: 0
SHORTNESS OF BREATH: 0
RHINORRHEA: 0
BLOOD IN STOOL: 0
PHOTOPHOBIA: 0
EYE ITCHING: 0
EYE DISCHARGE: 0
EYE PAIN: 0
CHOKING: 0
VOMITING: 0
ABDOMINAL PAIN: 0
NAUSEA: 0
SINUS PRESSURE: 0
CONSTIPATION: 0
TROUBLE SWALLOWING: 0
WHEEZING: 0
EYE REDNESS: 0

## 2020-09-10 NOTE — PROGRESS NOTES
No chief complaint on file. /60   Pulse 94   Temp 97.5 °F (36.4 °C)   Wt 141 lb 12.8 oz (64.3 kg)   SpO2 98%   BMI 23.60 kg/m²     HPI:  Lukasz Bourne is a 30 y.o. (: 1995) here today   for   HPI    Patient's medications, allergies, past medical, surgical, social and family histories were reviewed and updated asappropriate. Anxiety: Weaned off the Effexor. She is not feeling well. She is having headaches and feels fatigued. She feels like she could sleep a long time. We will do 2 weeks of a wash out period. On  she will start 25 mg Zoloft. Educated to increase water intake and take NSAIDS prn for headaches. When she starts Zoloft she will take it at night. She will continue to meet with Aidan Mancilla in the meantime. She said talking to Duc helped a lot and she really clicked with her. Advised that counseling with medication has a better outcome than just medication. ROS:  Review of Systems   Constitutional: Negative for activity change, appetite change, chills, diaphoresis, fatigue, fever and unexpected weight change. HENT: Negative for congestion, ear discharge, ear pain, hearing loss, nosebleeds, postnasal drip, rhinorrhea, sinus pressure, sinus pain, sneezing, sore throat, tinnitus, trouble swallowing and voice change. Eyes: Negative for photophobia, pain, discharge, redness and itching. Respiratory: Negative for cough, choking, chest tightness, shortness of breath, wheezing and stridor. Cardiovascular: Negative for chest pain, palpitations and leg swelling. Gastrointestinal: Negative for abdominal pain, blood in stool, constipation, diarrhea, nausea and vomiting. Endocrine: Negative for cold intolerance, heat intolerance, polydipsia and polyuria. Genitourinary: Negative for difficulty urinating, dysuria, enuresis, flank pain, frequency, hematuria and urgency. Musculoskeletal: Negative for back pain, gait problem, joint swelling, neck pain and neck stiffness. Right Sinus: No maxillary sinus tenderness or frontal sinus tenderness. Left Sinus: No maxillary sinus tenderness or frontal sinus tenderness. Mouth/Throat:      Pharynx: No oropharyngeal exudate. Eyes:      General:         Left eye: No discharge. Neck:      Musculoskeletal: Normal range of motion. Thyroid: No thyromegaly. Vascular: No JVD. Trachea: No tracheal deviation. Cardiovascular:      Rate and Rhythm: Normal rate and regular rhythm. Heart sounds: Normal heart sounds. No murmur. No friction rub. Pulmonary:      Effort: Pulmonary effort is normal. No respiratory distress. Breath sounds: Normal breath sounds. No stridor. No decreased breath sounds, wheezing, rhonchi or rales. Lymphadenopathy:      Cervical: No cervical adenopathy. Skin:     General: Skin is warm and dry. Capillary Refill: Capillary refill takes less than 2 seconds. Findings: No rash. Neurological:      Mental Status: She is alert and oriented to person, place, and time. Sensory: Sensation is intact. Motor: Motor function is intact. Coordination: Coordination normal.   Psychiatric:         Attention and Perception: Attention and perception normal.         Mood and Affect: Mood is anxious. Speech: Speech normal.         Behavior: Behavior normal. Behavior is cooperative. Thought Content: Thought content normal.         Cognition and Memory: Cognition normal.         Judgment: Judgment normal.      Comments: Constantly wringing hands in the office but less than before. ASSESSMENT/PLAN:    1. Anxiety    - sertraline (ZOLOFT) 25 MG tablet; Take 1 tablet by mouth daily  Dispense: 30 tablet; Refill: 1    She will follow up in 4 weeks with me after she has been on zoloft for 2 weeks. Follow up if symptoms do not improve or worsen. If the patient becomes short of breath go straight to the ER or call 911.

## 2020-09-22 ENCOUNTER — VIRTUAL VISIT (OUTPATIENT)
Dept: PSYCHOLOGY | Age: 25
End: 2020-09-22
Payer: COMMERCIAL

## 2020-09-22 PROCEDURE — 90832 PSYTX W PT 30 MINUTES: CPT | Performed by: SOCIAL WORKER

## 2020-09-22 NOTE — PROGRESS NOTES
Behavioral Health Consultation  Karlos Boyd. STEPHY Samayoa  9/22/2020  10:06 AM EDT      Time spent with Patient: 30 minutes  This is patient's second [facility] appointment. Reason for Consult:    Chief Complaint   Patient presents with    Anxiety    Depression     Referring Provider: Fidencio Washington  [address]    Feedback given to PCP. TELEHEALTH VISIT -- Audio/Visual (During ZSUAY-45 public health emergency)  }  Pursuant to the emergency declaration under the [facility] [place] waiver authority and the [employer] Act, this Virtual Visit was conducted, with patient's consent, to reduce the patient's risk of exposure to COVID-19 and provide continuity of care for an established patient. Services were provided through a video synchronous discussion virtually to substitute for in-person clinic visit. Pt gave verbal informed consent to participate in telehealth services. Conducted a risk-benefit analysis and determined that the patient's presenting problems are consistent with the use of telepsychology. Determined that the patient has sufficient knowledge and skills in the use of technology enabling them to adequately benefit from telepsychology. It was determined that this patient was able to be properly treated without an in-person session. Patient verified that they were currently located at the [facility] address that was provided during registration.     Verified the following information:  Patient's identification: Yes  Patient location: [place] [id number]   Patient's call back number: [phone]   Patient's emergency contact's name and number, as well as permission to contact them if needed: Extended Emergency Contact Information  Primary Emergency Contact: [facility]  Address: [address] [place] [address] Dr William Mitchell of [address] [place] [address] Phone: [phone]  Relation: Parent  Secondary Emergency Contact: Zulema Burtalea  Address: [address] [place] [address] Dr Holly Barraza of [address] [place] [address] Phone: [phone]  Mobile Phone: [phone]  Relation: Parent     Provider location: Amando Ship:  Pt endorses that she is doing okay overall. Sleep has been okay, she is still struggling with fatigue. She is off the effexor and will begin zoloft. Pt forgot to reach out to Devyndanitza Latif this week and get something scheduled. Pt did read the handout on PMR but didn't feel that she needed it. She did get anxious the other day and took an hydroxozine however slept for 6 hrs. Pt ordered the book boundaries and really enjoying this. Still struggles with boundaries with mom. Mom seems continues to be very controlling.         O:  MSE:    Appearance: good hygiene   Attitude: cooperative and friendly  Consciousness: alert  Orientation: oriented to person, place, time, general circumstance  Memory: recent and remote memory intact  Attention/Concentration: intact during session  Psychomotor Activity:normal  Eye Contact: normal  Speech: normal rate and volume, well-articulated  Mood: anxious but improved  Affect: euthymic  Perception: within normal limits  Thought Content: within normal limits  Thought Process: logical, coherent and goal-directed  Insight: good  Judgment: intact  Ability to understand instructions: Yes  Ability to respond meaningfully: Yes  Morbid Ideation: no   Suicide Assessment: no suicidal ideation, plan, or intent  Homicidal Ideation: no    History:    Medications:   Current Outpatient Medications   Medication Sig Dispense Refill    glycopyrrolate (ROBINUL) 1 MG tablet Take 1 mg by mouth 2 times daily       [START ON 9/24/2020] sertraline (ZOLOFT) 25 MG tablet Take 1 tablet by mouth daily 30 tablet 1    atorvastatin (LIPITOR) 10 MG tablet Take 1 tablet by mouth daily 90 tablet 1    levonorgestrel-ethinyl estradiol (SEASONALE) 0.15-0.03 MG per tablet Take 1 tablet by mouth daily 91 tablet 3    guaiFENesin (MUCINEX) 600 MG extended release tablet Take 1 tablet by mouth 2 times daily 30 tablet 0     No current facility-administered medications for this visit. Social History:   Social History     Socioeconomic History    Marital status: Single     Spouse name: Not on file    Number of children: Not on file    Years of education: Not on file    Highest education level: Not on file   Occupational History    Occupation: eligability specialist   Social Needs    Financial resource strain: Not on file    Food insecurity     Worry: Not on file     Inability: Not on file   [employer] needs     Medical: Not on file     Non-medical: Not on file   Tobacco Use    Smoking status: Never Smoker    Smokeless tobacco: Never Used   Substance and Sexual Activity    Alcohol use: No    Drug use: No    Sexual activity: Never     Birth control/protection: Pill   Lifestyle    Physical activity     Days per week: Not on file     Minutes per session: Not on file    Stress: Not on file   Relationships    Social connections     Talks on phone: Not on file     Gets together: Not on file     Attends Jehovah's witness service: Not on file     Active member of club or organization: Not on file     Attends meetings of clubs or organizations: Not on file     Relationship status: Not on file    Intimate partner violence     Fear of current or ex partner: Not on file     Emotionally abused: Not on file     Physically abused: Not on file     Forced sexual activity: Not on file   Other Topics Concern    Not on file   Social History Narrative    Not on file     TOBACCO:   reports that she has never smoked. She has never used smokeless tobacco.  ETOH:   reports no history of alcohol use.   Family History:   Family History   Problem Relation Age of Onset    Cancer Paternal Uncle 47        pancreatic cancer    Heart Disease Maternal Grandmother         valve replaced    COPD Maternal Grandmother     Coronary Art Dis Maternal Grandmother     Heart Disease Maternal Grandfather     Stroke Paternal Grandfather     Other Maternal Uncle     Other Paternal Aunt        A:  Pt endorses some improvement in mood since last appt. Reading boundaries book. No SI/HI, insight and motivation good. Diagnosis:    1. ETELVINA (generalized anxiety disorder)    2.  MDD (major depressive disorder), recurrent episode, moderate (HCC)          Diagnosis Date    Acne     Anxiety     Benign mole     on right nasal bridge, Dr. Mily Lombardi, [facility]    Hyperlipemia     Hyperlipemia     Kidney stones 2018     Plan:  Pt interventions:  Provided handout on  depression, Trained in strategies for increasing balanced thinking, Discussed and set plan for behavioral activation, Discussed self-care (sleep, nutrition, rewarding activities, social support, exercise), Motivational Interviewing to increase patient confidence and compliance with adhering to behavioral change plan, Motivational Interviewing to determine importance and readiness for change and Supportive techniques    Pt Behavioral Change Plan:   See Pt Instructions

## 2020-09-22 NOTE — PATIENT INSTRUCTIONS
1. Continue with reading boundaries  2. Revive [facility] (Ashley Gamino) [phone]  8. Review the list of cognitive distortions, we will   4. Let's set a goal of doing 15-30 min of organization  5. Consider setting time limits for conversations with your mom and stick to this. 6.  Return to see Theora Span in 2 weeks. `  All or Nothing Thinking refers to your tendency to evaluate your personal qualities in extreme, black or white categories. E.g. Straight A student who receives a B on an exam concludes \"now I'm a failure\". All or nothing thinking forms the basis for perfectionism. It causes you to fear any mistake or imperfection because you will then see yourself as a complete loser, and you feel inadequate and worthless. This way of life is unrealistic because life is rarely completely either one way or the other. For example, no one is absolutely brilliant or totally stupid. Absolutes do not exist in this universe. If you try to force your experiences into absolutes categories, you will be constantly depressed because your perception will not conform to reality. You will set yourself up for discrediting yourself endlessly because whatever you do will never measure up to your exaggerated expectations. Overgeneralization  When you overgeneralize, you arbitrarily conclude that one thing that happened to you once will occur over and over again. Since what happened is invariable unpleasant, you feel upset. E.g.  A shy, young man mustered up his courage to ask a girl for a date. When she politely declined because of a previous engagement, he said to himself, \"I'm never going to get a date. No girl would ever want a date with me. I'll be lonely and miserable for the rest of my life. \"      In his distorted cognitions, he concluded that because she turned him down once, she would always do so, and that since all women have 100 percent identical tastes, he would endlessnessly and repeatedly be rejected by any eligible woman on the face of the earth. Mental Filter is when you pick out a negative detail in any situation and dwell on it exclusively, thus perceiving that the whole situation is negative. E.g. A depressed young college student overheard some other students making fun of her best friend. She became furious because she was thinking \"That is what the human race is basically like-cruel and insensitive! \"    She was overlooking the fact that in the previous months, few people, if any, had been cruel or insensitive to her. When you are depressed, you wear a pair of eyeglasses with special lenses that filter out any positive. All that you allow to enter your conscious mind is negative. Because you are not aware of this \"filtering process\", you conclude that everything is negative. This is known as \"selective abstraction\". It is a bad habit that will cause you to suffer much needless anguish. Disqualifying the Positive is the persistent tendency of some individuals to transform neutral or even positive experiences into negative ones. You don't just ignore positive experiences, you cleverly and swiftly turn them into nightmarish opposites. E.g. An everyday example of this would be the way most of us have been conditioned to respond to compliments. When someone praises your appearance or your work, you might automatically tell yourself, \"they're just being nice. \"  With one jerome blow, you mentally disqualify their compliment. You do the same thing when you tell them, \"Oh, it was nothing, really. \"    If you constantly throw cold water on the good things that happen, no wonder life seems damp and chilly to you! Disqualifying the positive is one of the most destructive forms of cognitive distortions. Whenever you have a negative experience, you dwell on it and conclude \"That proves what I've known all along. \"  In contrast, when you wrong?\"      You may then respond to these imagined negative reactions by withdrawal or counterattack. This self-defeating behavior pattern may act as a self -fulfilling prophecy and set up a negative interaction in a relationship when none exists in the first place. Jumping to Conclusions is when you arbitrarily jump to a negative conclusion that is not justified by the facts of the situation. \"fortune telling error\" is as if you had a crystal ball that foretold misery for you. You imagine that something bad is about to happen, and you take this prediction as a fact even though it is unrealistic.      E.g.  A  repeatedly told herself during anxiety attacks, \"I'm going to pass out or go crazy. \"  These predictions were unrealistic because she had never once passed out (or gone crazy!) in her entire life. Nor did she have any serious symptoms to suggest impending insanity. This negative prediction about her prognosis caused her to feel hopeless and out of control. Magnification and Minimization or \"binocular trick\" is when you are either blowing things up out of proportion or shrinking them. Magnification usually occurs when you look at your own errors, fears, or imperfections and exaggerate their importance. E.g. \"My God-I made a mistake. How terrible. My reputation will be ruined! \"      You are looking at your faults through the end of binoculars that makes them appear gigantic and grotesque. This is also called \"catastrophizing\" because you turn commonplace negative events into nightmarish monsters. When you think about your strengths, you may do the opposite- look through the wrong end of the binoculars so that things look small and unimportant. If you magnify your imperfections and minimize your good points, you're guaranteed to feel inferior. The problem isn't you-it's the crazy lens you're wearing!     Emotional Reasoning is when you take your righteous. Jayme Gamino either have to change your expectations to approximate reality or always feel let down by human behavior. When you direct should statements towards others, you will usually feel frustrated. Labeling and Mislabeling. Personal labeling means creating a completely negative self image based on your errors. It's an extreme form of overgeneralization. There is a good chance you are involved in a personal labeling whenever you describe your mistakes with sentences beginning with \"I'm a . Benedict Josefa Benedict Josefa \"      E.g. When you miss your putt on the eighteenth hole, you might say \"I'm a born loser\" instead of \"I goofed up on my putt. \"      Labeling yourself is not only self defeating, it is irrational.  Your self cannot be equated with any one thing you do. Your life is a complex and ever-changing flow and thoughts, emotions, and actions. Stop trying to define yourself with negative labels-they are overly simplistic and wrong. Would you think of yourself as an \"eater\" simply because you eat. When you label other people, you will invariably generate hostility.      E.g. A boss who sees his occasionally irritable  as Jarrod Marshall uncooperative bitch. \"  Because of this label, he resents her and jumps at every chance to criticize her. She in turn, labels him as \"insensitive chauvinist\" and complains about him at every opportunity. So, around and around they go at each other's throats, focusing on every weakness or imperfection as proof of the other's worthlessness. Mislabeling involves describing an event with words that are inaccurate and emotionally heavily loaded. For example, a woman on a diet ate a dish of ice cream and thought \"how disgusting and repulsive of me. I'm a pig. \" These thoughts made her so upset she ate the whole quart of ice cream.

## 2020-10-08 ENCOUNTER — OFFICE VISIT (OUTPATIENT)
Dept: FAMILY MEDICINE CLINIC | Age: 25
End: 2020-10-08
Payer: COMMERCIAL

## 2020-10-08 VITALS
SYSTOLIC BLOOD PRESSURE: 112 MMHG | HEART RATE: 78 BPM | OXYGEN SATURATION: 97 % | WEIGHT: 140.2 LBS | TEMPERATURE: 97.2 F | BODY MASS INDEX: 23.33 KG/M2 | DIASTOLIC BLOOD PRESSURE: 76 MMHG

## 2020-10-08 PROCEDURE — G8420 CALC BMI NORM PARAMETERS: HCPCS | Performed by: NURSE PRACTITIONER

## 2020-10-08 PROCEDURE — 90688 IIV4 VACCINE SPLT 0.5 ML IM: CPT | Performed by: NURSE PRACTITIONER

## 2020-10-08 PROCEDURE — G8482 FLU IMMUNIZE ORDER/ADMIN: HCPCS | Performed by: NURSE PRACTITIONER

## 2020-10-08 PROCEDURE — G8427 DOCREV CUR MEDS BY ELIG CLIN: HCPCS | Performed by: NURSE PRACTITIONER

## 2020-10-08 PROCEDURE — 1036F TOBACCO NON-USER: CPT | Performed by: NURSE PRACTITIONER

## 2020-10-08 PROCEDURE — 99213 OFFICE O/P EST LOW 20 MIN: CPT | Performed by: NURSE PRACTITIONER

## 2020-10-08 PROCEDURE — 90471 IMMUNIZATION ADMIN: CPT | Performed by: NURSE PRACTITIONER

## 2020-10-08 RX ORDER — SERTRALINE HYDROCHLORIDE 25 MG/1
25 TABLET, FILM COATED ORAL DAILY
Qty: 30 TABLET | Refills: 1 | Status: SHIPPED | OUTPATIENT
Start: 2020-10-08 | End: 2021-09-07

## 2020-10-08 ASSESSMENT — ENCOUNTER SYMPTOMS
BACK PAIN: 0
STRIDOR: 0
PHOTOPHOBIA: 0
COUGH: 0
SINUS PAIN: 0
SINUS PRESSURE: 0
TROUBLE SWALLOWING: 0
CHOKING: 0
EYE DISCHARGE: 0
VOMITING: 0
WHEEZING: 0
CONSTIPATION: 0
SHORTNESS OF BREATH: 0
EYE REDNESS: 0
CHEST TIGHTNESS: 0
DIARRHEA: 0
BLOOD IN STOOL: 0
COLOR CHANGE: 0
EYE PAIN: 0
EYE ITCHING: 0
NAUSEA: 0
ABDOMINAL PAIN: 0
SORE THROAT: 0
RHINORRHEA: 0
VOICE CHANGE: 0

## 2020-10-08 NOTE — PROGRESS NOTES
Chief Complaint   Patient presents with    Anxiety       /76   Pulse 78   Temp 97.2 °F (36.2 °C)   Wt 140 lb 3.2 oz (63.6 kg)   SpO2 97%   Breastfeeding No   BMI 23.33 kg/m²     HPI:  Nimco Ardon is a 30 y.o. (: 1995) here today   for   HPI       Anxiety:    She tried atarax and this wiped her out. We discussed if she needs it she can take half a tablet or even a quarter of a tablet. She is on 25MG Zoloft and doing well on this dose . She has been on this dose for 2 weeks and does not really feel any different. She is having less headaches and appears more calm in the office. Patient's medications, allergies, past medical, surgical, social and family histories were reviewed and updated asappropriate. Headaches have decreased. She would like her flu shot today. ROS:  Review of Systems   Constitutional: Negative for activity change, appetite change, chills, diaphoresis, fatigue, fever and unexpected weight change. HENT: Negative for congestion, ear discharge, ear pain, hearing loss, nosebleeds, postnasal drip, rhinorrhea, sinus pressure, sinus pain, sneezing, sore throat, tinnitus, trouble swallowing and voice change. Eyes: Negative for photophobia, pain, discharge, redness and itching. Respiratory: Negative for cough, choking, chest tightness, shortness of breath, wheezing and stridor. Cardiovascular: Negative for chest pain, palpitations and leg swelling. Gastrointestinal: Negative for abdominal pain, blood in stool, constipation, diarrhea, nausea and vomiting. Endocrine: Negative for cold intolerance, heat intolerance, polydipsia and polyuria. Genitourinary: Negative for difficulty urinating, dysuria, enuresis, flank pain, frequency, hematuria and urgency. Musculoskeletal: Negative for back pain, gait problem, joint swelling, neck pain and neck stiffness. Skin: Negative for color change, pallor, rash and wound.    Allergic/Immunologic: Negative for environmental Cognition and Memory: Cognition normal.         Judgment: Judgment normal.       ASSESSMENT/PLAN:  1. Anxiety    - sertraline (ZOLOFT) 25 MG tablet; Take 1 tablet by mouth daily  Dispense: 30 tablet; Refill: 1    She will continue on the 25 mg Zoloft for two more weeks making the total time 4 weeks and then will check in. We will then decide whether  to keep her on 25 mg or to bump her up to 50 mg of Zoloft. She agrees with the plan. 2. Need for influenza vaccination    - INFLUENZA, QUADV, 3 YRS AND OLDER, IM, MDV, 0.5ML ([facility] [place])    Follow up if symptoms do not improve or worsen. If the patient becomes short of breath go straight to the ER or call 911. Advised to call with any questions or if she starts having side effects.

## 2020-10-08 NOTE — PROGRESS NOTES
Vaccine Information Sheet, \"Influenza - Inactivated\"  given to Yusef Callahan, or parent/legal guardian of  Yusef Callahan and verbalized understanding. Patient responses:    Have you ever had a reaction to a flu vaccine? No  Do you have any current illness? No  Have you ever had Guillian Olin Syndrome? No  Do you have a serious allergy to any of the follow: Neomycin, Polymyxin, Thimerosal, eggs or egg products? No    Flu vaccine given per order. Please see immunization tab. Risks and benefits explained. Current VIS given.

## 2020-12-08 RX ORDER — LEVONORGESTREL AND ETHINYL ESTRADIOL 0.15-0.03
1 KIT ORAL DAILY
Qty: 91 TABLET | Refills: 0 | Status: SHIPPED | OUTPATIENT
Start: 2020-12-08 | End: 2021-02-27

## 2021-01-29 ENCOUNTER — OFFICE VISIT (OUTPATIENT)
Dept: OBGYN CLINIC | Age: 26
End: 2021-01-29
Payer: COMMERCIAL

## 2021-01-29 VITALS
HEIGHT: 65 IN | TEMPERATURE: 97.5 F | BODY MASS INDEX: 22.66 KG/M2 | WEIGHT: 136 LBS | DIASTOLIC BLOOD PRESSURE: 76 MMHG | HEART RATE: 99 BPM | RESPIRATION RATE: 18 BRPM | SYSTOLIC BLOOD PRESSURE: 116 MMHG

## 2021-01-29 DIAGNOSIS — Z01.419 WOMEN'S ANNUAL ROUTINE GYNECOLOGICAL EXAMINATION: Primary | ICD-10-CM

## 2021-01-29 DIAGNOSIS — N89.5 VAGINAL STENOSIS: ICD-10-CM

## 2021-01-29 DIAGNOSIS — N94.10 DYSPAREUNIA, FEMALE: ICD-10-CM

## 2021-01-29 DIAGNOSIS — F52.5 FUNCTIONAL VAGINISMUS: ICD-10-CM

## 2021-01-29 PROCEDURE — 99395 PREV VISIT EST AGE 18-39: CPT | Performed by: OBSTETRICS & GYNECOLOGY

## 2021-01-29 PROCEDURE — G8482 FLU IMMUNIZE ORDER/ADMIN: HCPCS | Performed by: OBSTETRICS & GYNECOLOGY

## 2021-01-29 SDOH — ECONOMIC STABILITY: FOOD INSECURITY: WITHIN THE PAST 12 MONTHS, THE FOOD YOU BOUGHT JUST DIDN'T LAST AND YOU DIDN'T HAVE MONEY TO GET MORE.: NEVER TRUE

## 2021-01-29 SDOH — ECONOMIC STABILITY: TRANSPORTATION INSECURITY
IN THE PAST 12 MONTHS, HAS LACK OF TRANSPORTATION KEPT YOU FROM MEETINGS, WORK, OR FROM GETTING THINGS NEEDED FOR DAILY LIVING?: NOT ASKED

## 2021-01-29 SDOH — ECONOMIC STABILITY: INCOME INSECURITY: HOW HARD IS IT FOR YOU TO PAY FOR THE VERY BASICS LIKE FOOD, HOUSING, MEDICAL CARE, AND HEATING?: NOT HARD AT ALL

## 2021-01-29 ASSESSMENT — PATIENT HEALTH QUESTIONNAIRE - PHQ9
SUM OF ALL RESPONSES TO PHQ QUESTIONS 1-9: 0
SUM OF ALL RESPONSES TO PHQ QUESTIONS 1-9: 0
2. FEELING DOWN, DEPRESSED OR HOPELESS: 0

## 2021-01-30 RX ORDER — DIBUCAINE 1 G/100G
OINTMENT TOPICAL 4 TIMES DAILY PRN
Qty: 60 G | Refills: 1 | Status: SHIPPED | OUTPATIENT
Start: 2021-01-30

## 2021-01-30 ASSESSMENT — ENCOUNTER SYMPTOMS
ABDOMINAL PAIN: 0
VOMITING: 0
SHORTNESS OF BREATH: 0
NAUSEA: 0
CONSTIPATION: 0
ABDOMINAL DISTENTION: 0
DIARRHEA: 0

## 2021-01-31 NOTE — PROGRESS NOTES
Subjective:      Patient ID: Gilberto Sethi is a 27 y.o. female. HPI   28 y/o [de-identified] female presents for annual examination. Patient has never had pap smear--unable to tolerate examination. Never sexually active. Since last visit has been unsuccessful with attempts at intercourse. Has never been able to tolerated a tampon. Has also noted recent issues with urinary hesitancy--unable to pee at first then urine squirts out. Now engaged to be . Extremely anxious regarding issues with dyspareunia. Nonsmoker. Patient utilizes [facility] for menstrual regulation--has done well with medication. Menses occur every 3 months x 5 days followed by spotting. Notes some cramping--Advil helps. Medical history is positive for renal lithiasis 1 year ago. Review of Systems   Constitutional: Positive for activity change. Negative for appetite change, chills, fatigue, fever and unexpected weight change. Respiratory: Negative for shortness of breath. Cardiovascular: Negative for chest pain. Gastrointestinal: Negative for abdominal distention, abdominal pain, constipation, diarrhea, nausea and vomiting. Endocrine: Negative for cold intolerance and heat intolerance. Genitourinary: Positive for dyspareunia and vaginal pain. Negative for difficulty urinating, dysuria, frequency, genital sores, hematuria, menstrual problem, pelvic pain, vaginal bleeding and vaginal discharge. Skin: Negative for rash. Neurological: Negative for headaches. Hematological: Does not bruise/bleed easily. Psychiatric/Behavioral: Negative. Objective:   Physical Exam  Vitals signs and nursing note reviewed. Exam conducted with a chaperone present. Constitutional:       General: She is not in acute distress. Appearance: Normal appearance. She is well-developed. She is not ill-appearing or toxic-appearing. HENT:      Head: Normocephalic and atraumatic. Neck:      Musculoskeletal: Neck supple.       Thyroid: No thyromegaly. Trachea: Trachea normal.   Cardiovascular:      Rate and Rhythm: Normal rate and regular rhythm. Heart sounds: Normal heart sounds, S1 normal and S2 normal.   Pulmonary:      Effort: Pulmonary effort is normal. No respiratory distress. Breath sounds: Normal breath sounds. Chest:      Breasts: Breasts are symmetrical.         Right: No inverted nipple, mass, nipple discharge, skin change or tenderness. Left: No inverted nipple, mass, nipple discharge, skin change or tenderness. Abdominal:      General: Bowel sounds are normal. There is no distension. Palpations: Abdomen is soft. Abdomen is not rigid. There is no mass. Tenderness: There is no abdominal tenderness. There is no guarding or rebound. Genitourinary:     General: Normal vulva. Exam position: Lithotomy position. Labia:         Right: No rash, tenderness, lesion or injury. Left: No rash, tenderness, lesion or injury. Vagina: No signs of injury and foreign body. Tenderness present. No vaginal discharge, erythema or bleeding. Uterus: Not tender. Adnexa:         Right: No mass, tenderness or fullness. Left: No mass, tenderness or fullness. Comments: Examination limited--patient unable to tolerate examination. Unable to fully evaluate vagina and cervix. Unable to perform pap smear. Musculoskeletal: Normal range of motion. Skin:     General: Skin is warm and dry. Findings: No erythema or rash. Nails: There is no clubbing. Neurological:      Mental Status: She is alert and oriented to person, place, and time. Psychiatric:         Speech: Speech normal.         Behavior: Behavior normal. Behavior is cooperative. Thought Content: Thought content normal.         Judgment: Judgment normal.         Assessment:       Diagnosis Orders   1. Women's annual routine gynecological examination     2.  Functional vaginismus  Ambulatory

## 2021-02-23 ENCOUNTER — HOSPITAL ENCOUNTER (OUTPATIENT)
Dept: PHYSICAL THERAPY | Age: 26
Setting detail: THERAPIES SERIES
Discharge: HOME | End: 2021-02-23
Payer: COMMERCIAL

## 2021-02-23 PROCEDURE — 97530 THERAPEUTIC ACTIVITIES: CPT

## 2021-02-23 PROCEDURE — 97140 MANUAL THERAPY 1/> REGIONS: CPT

## 2021-02-23 PROCEDURE — 97163 PT EVAL HIGH COMPLEX 45 MIN: CPT

## 2021-02-23 PROCEDURE — 97110 THERAPEUTIC EXERCISES: CPT

## 2021-02-23 NOTE — FLOWSHEET NOTE
[facility], [place] [address] [address], [address]  Phone: [phone]  Fax [phone]        Physical Therapy Pelvic Floor Evaluation    2021  Patient: Aziza Bauer : 1995  MRN: [id number]    This is a 27 y.o. female referred by Maday Stoner, * to [place] [address] with a primary diagnosis of: F52.5 (ICD-10-CM) - Functional jymrztqoriQ30.10 (ICD-10-CM) - Dyspareunia, female    Onset Date: 2016  Next MD appt: prn    C-SSRS Triggered by Intake questionnaire (Past 2 wk assessment):   [x] No, Questionnaire did not trigger screening.   [] Yes, Patient intake triggered further evaluation      [] C-SSRS Screening completed  [] PCP notified via Plan of Care  [] Emergency services notified     Subjective:   Patient history: The patient reports \"I have always had a hard time with UTIs and yeast infections and kidney stones\". \"I have never been able to wear a tampon\". \"I have trouble going pee, it will go a little and then stop and then go a little, it isn't a normal stream\". Reports she is unable to undergo pelvic examinations or partake in intercourse without pain of 8-9/10. Reports she also has urinary frequency. \"sometimes I feel like if I laugh I will leak urine\". \"sometimes I sit on the toilet for 20 minutes just trying to get all of the urine out and I just can't\". Reports she has occasional straining with bowel movements. Denies history of sexual abuse/trauma. Reports \"I can't hold my urine\". States she will purposefully not drink any fluid because \"I don't want to be having to go to the bathroom\". 2nd person present during evaluation today? Declined  What do you feel is your problem? Pain with intercourse and tampon use. When did you problem first start? In 2016  Has your problem been getting worse, stay the same, or getting better?  Staying about the same Have you ever had treatment for this problem? NO    4 week or greater of failed trial of PFPT program? NO   PFPT program as defined by \"Completing 4 weeks of an ordered plan of pelvic muscle exercises designed to increase periurethral muscle strength\". Urinary frequency? Daytime? YES Nighttime? YES  Bowel problems? straining  Urinary or bowel leakage? Urinary   Leakage frequency? Occasionally   Protection: none   Pregnancy:   # of pregnancies:0     Are you having regular menstrual cycles? yes  Date of last pap smear? Never due to pain    Pain: current level of pain: 0/10  Location: vagina  Pain: Best? 0/10  Worst? 8-9/10  Increased pain? Simms, tampon use, pelvic examinations  Decreases pain? Pelvic rest     Sensation/neurovascular: WNL  Diagnostic tests: none to date    Precautions/Contraindications: universal     Past Medical History:   Diagnosis Date    Acne     Anxiety     Benign mole     on right nasal bridge, Dr. Lenard Sofia, [facility]    Hyperlipemia     Hyperlipemia     Kidney stones 2018     Past Surgical History:   Procedure Laterality Date    WISDOM TOOTH EXTRACTION       Not in a hospital admission.     Current Outpatient Medications:     dibucaine 1 % perianal ointment, Place around the anus 4 times daily as needed for Pain, Disp: 60 g, Rfl: 1    levonorgestrel-ethinyl estradiol (SEASONALE) 0.15-0.03 MG per tablet, TAKE 1 TABLET BY MOUTH  DAILY, Disp: 91 tablet, Rfl: 0    sertraline (ZOLOFT) 25 MG tablet, Take 1 tablet by mouth daily, Disp: 30 tablet, Rfl: 1    glycopyrrolate (ROBINUL) 1 MG tablet, Take 1 mg by mouth 2 times daily , Disp: , Rfl:     atorvastatin (LIPITOR) 10 MG tablet, Take 1 tablet by mouth daily, Disp: 90 tablet, Rfl: 1    guaiFENesin (MUCINEX) 600 MG extended release tablet, Take 1 tablet by mouth 2 times daily, Disp: 30 tablet, Rfl: 0  No Known Allergies      Objective:    PFDI score: 121/300    Observation: Posture: WFL, pelvic protection posturing in standing and sitting   Gait analysis: WFL  Lumbar Mobility: WFL  Scar Location/Mobility: n/a      Special Tests:  Sacroiliac Test:   Gaenslen: negative    SHARI: negative     Lumbar Spine:   Slump test: negative    Neuro:   Sensation: (B) UEs: intact to light touch   Sensation: (B) LEs: intact to light touch   Anal Sensation:    S5 intact to light touch    S4 intact to light touch    S3 intact to light touch   Reflexes:     Anal: present    Cough: present     Pelvic Floor Exam  External:  Skin Condition: normal  Sensation: intact  Palpation: upon palpation of (B) labia externally patient became very protective and started to scoot away from PT on the table, became VERY guarded and arched back and arms and held breath. Also tightened stomach & legs. With verbal coaching and diaphragmatic breathing, patient was able to calm herself and PT did not move finger from outside labia, patient reported pain of 9/10 to 0/10 following this treatment. No internal examination was performed today. Tone: hypertonic  Perineal Body Mobility:    Voluntary PFM Contraction: present (normal)   Voluntary PFM Relaxation: present (normal) though delayed    Involuntary PFM Contraction/Cough Reflex: present (normal)   Involuntary PFM Relaxation: present (normal)  Perineal Body Descent:   Rest: supported   Bearing down: absent (normal-cephalad)    Q-tip test:    (R) UQ: 8/10   (L) UQ: 8/10   (R) LQ: 8/10   (L) LQ: 8/10   Post. Fourchette[de-identified] 8/10    Internal: unable to assess this visit.       Treatment: see flowsheet    Assessment: Impression: The patient is a 28 yo female referred to PT due to F52.5 (ICD-10-CM) - Functional azoinpbkefI40.10 (ICD-10-CM) - Dyspareunia, female. The patient entered PT room very anxious and guarded, though agreeable to entire treatment above. Upon examination the patient was found to present very guarded and reporting high pain levels to touch of (B) labia, unable to perform internal examination due to significant guarding and tightness and the patient was not mentally ready to undergo such treatment. Today's treatment consisted of relaxation and becoming more comfortable with surface pressure of the vagina. Patient responded very well and was able to relax enough that pain levels went from being reported at 8-9/10 to 0/10. The patient demonstrated good potential to improve with pelvic floor PT. With HEP and PT compliance, the patient should improve well. Time Frame: 8-10 weeks. Rehab Potential: good    Goals:  1. Patient will demonstrate independence with HEP to self-manage symptoms. 2. Patient will be able to tolerate internal pelvic muscle examination with pain peaking at less than 5/10 to further assess causes of pelvic floor pain and dysfunction. 3. Patient will consistently be able to use tampons without increased pain to improve hygiene and quality of life. 4. Patient will improve PFDI score by at least 50 points demonstrating improved pelvic floor functioning and quality of life. Plan  Patient will be seen 1-2x/week for 8-10weeks. Rx to consist of: Home management, NMred, manual, modalities PRN, TA, TE    Time IN/OUT: 9:00am-10:30am      [facility] PT, DPT    ______________________________________________________________________    If you have any questions or concerns, please don't hesitate to call.   Thank you for your referral.    Physician Signature:________________________________Date:__________________  By signing above, therapists plan is approved by physician

## 2021-02-23 NOTE — FLOWSHEET NOTE
[facility], [facility], [address], [address]  Phone: [phone]  Fax [phone]        Physical Therapy Daily Treatment Note    Date:  2021    Patient Name:  Betzy Naik    :  1995  MRN: [id number]  Restrictions/Precautions: universal    Medical/Treatment Diagnosis Information:  · Diagnosis: F52.5 (ICD-10-CM) - Functional hnsxggmvhbS42.10 (ICD-10-CM) - Dyspareunia, female  Insurance/Certification information:  PT Insurance Information: [address] [facility] - $50 co-pay  Physician Information:  Referring Practitioner: Letty Archuleta DO  Plan of care signed (Y/N):  N  Visit# / total visits:       G-Code (if applicable):          PFDI: 121/300    Medicare Cap (if applicable):  n/a = total amount used, updated 2021    Time in:  9:00am     Timed Treatment: 70min. Total Treatment Time:  85min.  ________________________________________________________________________________________    Pain Level:    /10  SUBJECTIVE:  See eval    OBJECTIVE:     Exercise (TE) Resistance/Repetitions Other comments            PF strengthening                                  PF relaxation Belly breathing:x10         Hip ADD stretch:x10         Happy Baby Pose:x10      Progressive muscle relaxation                   Other Treatment:   TA:  Bladder re-training/education:     Bladder Diary: patient educated on importance of filling out bladder diary at home, complete with fluid intake, voids, and leakage when applicable. Voiding: patient educated on normal voiding and urinary cycle and the physiology of bladder control muscles and pelvic floor. The patient was educated on bowel and bladder dysfunction. The patient was also educated on causes of pelvic dysfunction and pain     Dietary: patient educated on the \"4Cs\" to reduce bladder irritation and leakage.     Other: progression of treatment

## 2021-02-27 RX ORDER — LEVONORGESTREL AND ETHINYL ESTRADIOL 0.15-0.03
1 KIT ORAL DAILY
Qty: 91 TABLET | Refills: 2 | Status: SHIPPED | OUTPATIENT
Start: 2021-02-27 | End: 2021-11-08

## 2021-03-09 ENCOUNTER — APPOINTMENT (OUTPATIENT)
Dept: PHYSICAL THERAPY | Age: 26
End: 2021-03-09
Payer: COMMERCIAL

## 2021-03-16 ENCOUNTER — HOSPITAL ENCOUNTER (OUTPATIENT)
Dept: PHYSICAL THERAPY | Age: 26
Setting detail: THERAPIES SERIES
Discharge: HOME | End: 2021-03-16
Payer: COMMERCIAL

## 2021-03-16 PROCEDURE — 97140 MANUAL THERAPY 1/> REGIONS: CPT

## 2021-03-16 PROCEDURE — 97530 THERAPEUTIC ACTIVITIES: CPT

## 2021-03-16 PROCEDURE — 97112 NEUROMUSCULAR REEDUCATION: CPT

## 2021-03-16 NOTE — FLOWSHEET NOTE
[facility], [facility], [address], [address]  Phone: [phone]  Fax [phone]        Physical Therapy Daily Treatment Note    Date:  3/16/2021    Patient Name:  Gilberto Sethi    :  1995  MRN: [id number]  Restrictions/Precautions: universal    Medical/Treatment Diagnosis Information:  · Diagnosis: F52.5 (ICD-10-CM) - Functional topfxxfkdwM71.10 (ICD-10-CM) - Dyspareunia, female  Insurance/Certification information:  PT Insurance Information: [employer] - $50 co-pay  Physician Information:  Referring Practitioner: Madison Alvarez DO  Plan of care signed (Y/N):  N  Visit# / total visits:       G-Code (if applicable):          PFDI: 121/300    Medicare Cap (if applicable):  n/a = total amount used, updated 3/16/2021    Time in:  9:05am     Timed Treatment: 40min. Total Treatment Time:  50min.  ________________________________________________________________________________________    Pain Level:    0/10  SUBJECTIVE: Patient reports \"I am doing okay and have been doing my homework\". Reports compliance with HEP. OBJECTIVE:     Exercise (TE) Resistance/Repetitions Other comments            PF strengthening                                  PF relaxation Belly breathing:x10         Hip ADD stretch:x10         Happy Baby Pose:x10      Progressive muscle relaxation                   Other Treatment:   TA:  Bladder re-training/education:     Bladder Diary: 9-13voids/day, 2 BM/day    Voiding: educated to void every 2-3 hours    Dietary: patient educated on the \"4Cs\" to reduce bladder irritation. Other: progression of treatment, affect of anxiety on urination frequency and pelvic floor tightness.     Manual Treatments: external vaginal pressure, 1 finger insert to 1st knuckle sustained pressure with VCs for muscle relaxation    Neuromuscular re-education: progressive mm relaxation, mindfulness Modalities: --     Test/Measurements:  See eval         ASSESSMENT:  The patient demonstrated correlation with anxiety and urinary frequency, patient verbalized good understanding of correlation education. Advised to increase water intake to 60oz/day. Adapted nicely to added neuromuscular re-education. Patient demonstrated good progression with manual therapy this date, able to tolerate increased depth with decreased stress response. Patient ordered dilators, have not arrived as of yet. Treatment/Activity Tolerance:   [x] Patient tolerated treatment well [] Patient limited by fatique  [] Patient limited by pain [] Patient limited by other medical complications  [] Other:     Goals:    Time Frame: 8-10 weeks. Rehab Potential: good     Goals:  1. Patient will demonstrate independence with HEP to self-manage symptoms. 2. Patient will be able to tolerate internal pelvic muscle examination with pain peaking at less than 5/10 to further assess causes of pelvic floor pain and dysfunction. 3. Patient will consistently be able to use tampons without increased pain to improve hygiene and quality of life. 4. Patient will improve PFDI score by at least 50 points demonstrating improved pelvic floor functioning and quality of life.           Plan: [x] Continue per plan of care [] Alter current plan (see comments)   [] Plan of care initiated [] Hold pending MD visit [] Discharge      Plan for Next Session:  continue manual, pelvic floor stretching    Re-Certification Due Date:    Visit 16    Signature:  Paulie Grigsby PT, DPT

## 2021-03-23 ENCOUNTER — HOSPITAL ENCOUNTER (OUTPATIENT)
Dept: PHYSICAL THERAPY | Age: 26
Setting detail: THERAPIES SERIES
Discharge: HOME | End: 2021-03-23
Payer: COMMERCIAL

## 2021-03-23 PROCEDURE — 97140 MANUAL THERAPY 1/> REGIONS: CPT

## 2021-03-23 PROCEDURE — 97530 THERAPEUTIC ACTIVITIES: CPT

## 2021-03-23 NOTE — FLOWSHEET NOTE
[facility], [facility],  [address], [address]  Phone: [phone]  Fax [phone]        Physical Therapy Daily Treatment Note    Date:  3/23/2021    Patient Name:  Tere Merino    :  1995  MRN: [id number]  Restrictions/Precautions: universal    Medical/Treatment Diagnosis Information:  · Diagnosis: F52.5 (ICD-10-CM) - Functional kbxlfptvpdQ85.10 (ICD-10-CM) - Dyspareunia, female  Insurance/Certification information:  PT Insurance Information: [employer] - $50 co-pay  Physician Information:  Referring Practitioner: Joli Kanner, DO  Plan of care signed (Y/N):  Y  Visit# / total visits:  3/20     G-Code (if applicable):          PFDI: 121/300    Medicare Cap (if applicable):  n/a = total amount used, updated 3/23/2021    Time in:  9:05am     Timed Treatment: 40min. Total Treatment Time:  40min.  ________________________________________________________________________________________    Pain Level:    0/10  SUBJECTIVE: Patient reports \"I am doing okay and have been doing my homework\". Reports compliance with HEP. OBJECTIVE:     Exercise (TE) Resistance/Repetitions Other comments            PF strengthening                                  PF relaxation Belly breathing:x10         Hip ADD stretch:x10         Happy Baby Pose:x10      Progressive muscle relaxation                   Other Treatment:   TA:  Bladder re-training/education:     Bladder Diary: 9-13voids/day, 2 BM/day    Voiding: educated to void every 2-3 hours    Dietary: patient educated on the \"4Cs\" to reduce bladder irritation.     Other: progression of treatment, affect of anxiety on urination frequency and pelvic floor tightness, dilator use    Manual Treatments: external vaginal pressure, 1 finger insert to full length with extra time, sustained pressure with VCs for muscle relaxation 8/10 to 3/10    Neuromuscular re-education: mindfulness         Modalities: --     Test/Measurements:  See eval         ASSESSMENT:  Patient demonstrated good progression with manual therapy this date, able to tolerate increased depth with decreased stress response. Patient ordered dilators, have not arrived as of yet. Dilator education reviewed this date. Patient is making good progress overall. Treatment/Activity Tolerance:   [x] Patient tolerated treatment well [] Patient limited by fatique  [] Patient limited by pain [] Patient limited by other medical complications  [] Other:     Goals:    Time Frame: 8-10 weeks. Rehab Potential: good     Goals:  1. Patient will demonstrate independence with HEP to self-manage symptoms. 2. Patient will be able to tolerate internal pelvic muscle examination with pain peaking at less than 5/10 to further assess causes of pelvic floor pain and dysfunction. 3. Patient will consistently be able to use tampons without increased pain to improve hygiene and quality of life. 4. Patient will improve PFDI score by at least 50 points demonstrating improved pelvic floor functioning and quality of life.           Plan: [x] Continue per plan of care [] Alter current plan (see comments)   [] Plan of care initiated [] Hold pending MD visit [] Discharge      Plan for Next Session:  continue manual, pelvic floor stretching    Re-Certification Due Date:    Visit 16    Signature:  Cydney Bledsoe, PT, DPT

## 2021-03-30 ENCOUNTER — HOSPITAL ENCOUNTER (OUTPATIENT)
Dept: PHYSICAL THERAPY | Age: 26
Setting detail: THERAPIES SERIES
Discharge: HOME | End: 2021-03-30
Payer: COMMERCIAL

## 2021-03-30 PROCEDURE — 97530 THERAPEUTIC ACTIVITIES: CPT

## 2021-03-30 PROCEDURE — 97140 MANUAL THERAPY 1/> REGIONS: CPT

## 2021-03-30 NOTE — FLOWSHEET NOTE
[facility], [facility], [address], [address]  Phone: [phone]  Fax [phone]        Physical Therapy Daily Treatment Note    Date:  3/30/2021    Patient Name:  Jonelle Roper    :  1995  MRN: [id number]  Restrictions/Precautions: universal    Medical/Treatment Diagnosis Information:  · Diagnosis: F52.5 (ICD-10-CM) - Functional vqfosdgtsjX82.10 (ICD-10-CM) - Dyspareunia, female  Insurance/Certification information:  PT Insurance Information: [facility] - $50 co-pay  Physician Information:  Referring Practitioner: Manuel Iyer DO  Plan of care signed (Y/N):  Y  Visit# / total visits:       G-Code (if applicable):          PFDI: 121/300    Medicare Cap (if applicable):  n/a = total amount used, updated 3/30/2021    Time in:  9:05am     Timed Treatment: 40min. Total Treatment Time:  40min.  ________________________________________________________________________________________    Pain Level:    0/10  SUBJECTIVE: Patient reports \"I am doing okay, I got the dilators but I haven't started using them because I am scared\". Reports compliance with HEP. OBJECTIVE:     Exercise (TE) Resistance/Repetitions Other comments            PF strengthening                                  PF relaxation Belly breathing:x10         Hip ADD stretch:x10         Happy Baby Pose:x10      Progressive muscle relaxation                   Other Treatment:   TA:  Bladder re-training/education:     Bladder Diary: 9-13voids/day, 2 BM/day    Voiding: educated to void every 2-3 hours    Dietary: patient educated on the \"4Cs\" to reduce bladder irritation.     Other: progression of treatment, affect of anxiety on urination frequency and pelvic floor tightness, dilator use    Manual Treatments: external vaginal pressure, 1 finger insert to full length with extra time, sustained pressure with VCs for muscle relaxation 8/10 to 3/10, dilator use, use of gloves to increase comfort ability with dilators. Neuromuscular re-education:  mindfulness         Modalities: --     Test/Measurements:  See eval         ASSESSMENT:  Patient demonstrated good progression with manual therapy this date, able to tolerate increased depth with decreased stress response. The patient was too scared to use dilators on own, thus allowed patient to use dilators in today's session with VCs from me. The patient did need assistance finding the vaginal introitus, demonstrated increased comfort with use of gloves which she wore while using dilators. Encouraged patient to continue with progression. Dilator education reviewed this date. Patient is making good progress overall. Treatment/Activity Tolerance:   [x] Patient tolerated treatment well [] Patient limited by fatique  [] Patient limited by pain [] Patient limited by other medical complications  [] Other:     Goals:    Time Frame: 8-10 weeks. Rehab Potential: good     Goals:  1. Patient will demonstrate independence with HEP to self-manage symptoms. 2. Patient will be able to tolerate internal pelvic muscle examination with pain peaking at less than 5/10 to further assess causes of pelvic floor pain and dysfunction. 3. Patient will consistently be able to use tampons without increased pain to improve hygiene and quality of life. 4. Patient will improve PFDI score by at least 50 points demonstrating improved pelvic floor functioning and quality of life.           Plan: [x] Continue per plan of care [] Alter current plan (see comments)   [] Plan of care initiated [] Hold pending MD visit [] Discharge      Plan for Next Session:  continue manual, pelvic floor stretching    Re-Certification Due Date:    Visit 16    Signature:  Bryan Paulino, PT, DPT

## 2021-04-06 ENCOUNTER — HOSPITAL ENCOUNTER (OUTPATIENT)
Dept: PHYSICAL THERAPY | Age: 26
Setting detail: THERAPIES SERIES
Discharge: HOME | End: 2021-04-06
Payer: COMMERCIAL

## 2021-04-06 PROCEDURE — 97140 MANUAL THERAPY 1/> REGIONS: CPT

## 2021-04-06 PROCEDURE — 97110 THERAPEUTIC EXERCISES: CPT

## 2021-04-06 PROCEDURE — 97530 THERAPEUTIC ACTIVITIES: CPT

## 2021-04-15 ENCOUNTER — HOSPITAL ENCOUNTER (OUTPATIENT)
Dept: PHYSICAL THERAPY | Age: 26
Setting detail: THERAPIES SERIES
Discharge: HOME | End: 2021-04-15
Payer: COMMERCIAL

## 2021-04-15 NOTE — FLOWSHEET NOTE
[facility], [facility],  [address], [address]  Phone: [phone]  Fax [phone]        Physical Therapy  Cancellation/No-show Note  Patient Name:  Nohemi Chiang  :  1995   Date:  4/15/2021  Cancels to date: 1  No-shows to date: 0    For today's appointment patient:  [x] Cancelled  [] Rescheduled appointment  [] No-show     Reason given by patient:  [] Patient ill  [] Conflicting appointment  [] No transportation    [] Conflict with work  [x] No reason given  [] Other:     Comments:      Electronically signed by:  Mikala Escobedo PT

## 2021-04-22 ENCOUNTER — HOSPITAL ENCOUNTER (OUTPATIENT)
Dept: PHYSICAL THERAPY | Age: 26
Setting detail: THERAPIES SERIES
Discharge: HOME | End: 2021-04-22
Payer: COMMERCIAL

## 2021-04-22 PROCEDURE — 97530 THERAPEUTIC ACTIVITIES: CPT

## 2021-04-22 PROCEDURE — 97110 THERAPEUTIC EXERCISES: CPT

## 2021-04-22 PROCEDURE — 97140 MANUAL THERAPY 1/> REGIONS: CPT

## 2021-04-22 NOTE — FLOWSHEET NOTE
[facility], [place] [address] [address]  [address] [place] [address], [address] [place] [address]  Phone: [phone]  Fax [phone]        Physical Therapy Daily Treatment Note    Date:  2021    Patient Name:  Jose Luis Iyer    :  1995  MRN: [id number]  Restrictions/Precautions: universal    Medical/Treatment Diagnosis Information:  · Diagnosis: F52.5 (ICD-10-CM) - Functional nnttojgkpvV84.10 (ICD-10-CM) - Dyspareunia, female  Insurance/Certification information:  PT Insurance Information: [address] [facility] - $50 co-pay  Physician Information:  Referring Practitioner: Shane Marino DO  Plan of care signed (Y/N):  Y  Visit# / total visits:       G-Code (if applicable):          PFDI: 121/300    Medicare Cap (if applicable):  n/a = total amount used, updated 2021    Time in:  9:00am     Timed Treatment: 50min. Total Treatment Time:  50min.  ________________________________________________________________________________________    Pain Level:    0/10  SUBJECTIVE: Patient reports \"I am still using the smallest dilator, I am just struggling trying\". Reports some compliance with HEP. OBJECTIVE:     Exercise (TE) Resistance/Repetitions Other comments            PF strengthening                                  PF relaxation Belly breathing:x10         Hip ADD stretch:x10         Happy Baby Pose:x10                         Other Treatment:   TA:  Bladder re-training/education:         Other: progression of treatment, review of dilator use, progression of dilator use, relaxation, progressing activity with partner for pain management and advancement    Manual Treatments: external vaginal pressure, 1 finger insert to full length, sustained pressure with VCs for muscle relaxation /10 to 10, patient dilator use with VCs and TCs provided by PT, Patient used gloves to increase comfort ability with dilators.     Neuromuscular re-education:  mindfulness Modalities: --     Test/Measurements:  See eval         ASSESSMENT:  Patient demonstrated good progression with manual therapy this date. Able to use orange dilator this date. Making great progression overall. Continues to required VCs and TCs to relax throughout session. Patient is demonstrating decreased stress response with manual therapy and dilator use in clinic. Encouraged patient to continue with progression. Treatment/Activity Tolerance:   [x] Patient tolerated treatment well [] Patient limited by fatique  [] Patient limited by pain [] Patient limited by other medical complications  [] Other:     Goals:    Time Frame: 8-10 weeks. Rehab Potential: good     Goals:  1. Patient will demonstrate independence with HEP to self-manage symptoms. 2. Patient will be able to tolerate internal pelvic muscle examination with pain peaking at less than 5/10 to further assess causes of pelvic floor pain and dysfunction. 3. Patient will consistently be able to use tampons without increased pain to improve hygiene and quality of life. 4. Patient will improve PFDI score by at least 50 points demonstrating improved pelvic floor functioning and quality of life.           Plan: [x] Continue per plan of care [] Alter current plan (see comments)   [] Plan of care initiated [] Hold pending MD visit [] Discharge      Plan for Next Session:  continue manual, pelvic floor stretching    Re-Certification Due Date:    Visit 16    Signature:  Nirav Valera, PT, DPT

## 2021-04-27 ENCOUNTER — HOSPITAL ENCOUNTER (OUTPATIENT)
Dept: PHYSICAL THERAPY | Age: 26
Setting detail: THERAPIES SERIES
Discharge: HOME | End: 2021-04-27
Payer: COMMERCIAL

## 2021-04-27 PROCEDURE — 97530 THERAPEUTIC ACTIVITIES: CPT

## 2021-04-27 PROCEDURE — 97110 THERAPEUTIC EXERCISES: CPT

## 2021-04-27 PROCEDURE — 97140 MANUAL THERAPY 1/> REGIONS: CPT

## 2021-04-27 NOTE — FLOWSHEET NOTE
[facility] 79. and Therapy, [facility], [address], [address]  Phone: [phone]  Fax [phone]        Physical Therapy Daily Treatment Note    Date:  2021    Patient Name:  Jennifer Reyes    :  1995  MRN: [id number]  Restrictions/Precautions: universal    Medical/Treatment Diagnosis Information:  · Diagnosis: F52.5 (ICD-10-CM) - Functional tjyxqnpaowS01.10 (ICD-10-CM) - Dyspareunia, female  Insurance/Certification information:  PT Insurance Information: [address] [facility] - $50 co-pay  Physician Information:  Referring Practitioner: Anahi La DO  Plan of care signed (Y/N):  Y  Visit# / total visits:       G-Code (if applicable):          PFDI: 121/300    Medicare Cap (if applicable):  n/a = total amount used, updated 2021    Time in:  9:00am     Timed Treatment: 45min. Total Treatment Time:  45min.  ________________________________________________________________________________________    Pain Level:    0/10  SUBJECTIVE: Patient reports \"I am still using the smallest dilator\". \"I did order the CBD lubricant\". \"I am doing a lot better than where I was\". Reports some compliance with HEP.     OBJECTIVE:     Exercise (TE) Resistance/Repetitions Other comments            PF strengthening                                  PF relaxation Belly breathing:x10         Hip ADD stretch:x10         Happy Baby Pose:x10                         Other Treatment:   TA:  Bladder re-training/education:         Other: progression of treatment, review of dilator use, progression of dilator use, relaxation, progressing activity with partner for pain management and advancement    Manual Treatments: external vaginal pressure, 1 finger insert to full length, sustained pressure with VCs for muscle relaxation 7/10 to 3/10, patient dilator use with VCs and TCs provided by PT, Patient used gloves to increase comfort ability with

## 2021-04-29 ENCOUNTER — APPOINTMENT (OUTPATIENT)
Dept: PHYSICAL THERAPY | Age: 26
End: 2021-04-29
Payer: COMMERCIAL

## 2021-05-06 ENCOUNTER — HOSPITAL ENCOUNTER (OUTPATIENT)
Dept: PHYSICAL THERAPY | Age: 26
Setting detail: THERAPIES SERIES
Discharge: HOME | End: 2021-05-06
Payer: COMMERCIAL

## 2021-05-06 PROCEDURE — 97530 THERAPEUTIC ACTIVITIES: CPT

## 2021-05-06 PROCEDURE — 97110 THERAPEUTIC EXERCISES: CPT

## 2021-05-06 PROCEDURE — 97140 MANUAL THERAPY 1/> REGIONS: CPT

## 2021-05-06 NOTE — FLOWSHEET NOTE
[facility], [facility], [address], [address]  Phone: [phone]  Fax [phone]        Physical Therapy Daily Treatment Note    Date:  2021    Patient Name:  Jose Luis Iyer    :  1995  MRN: [id number]  Restrictions/Precautions: universal    Medical/Treatment Diagnosis Information:  · Diagnosis: F52.5 (ICD-10-CM) - Functional dhwfnqeaszH92.10 (ICD-10-CM) - Dyspareunia, female  Insurance/Certification information:  PT Insurance Information: [address] [facility] - $50 co-pay  Physician Information:  Referring Practitioner: Shane Marino DO  Plan of care signed (Y/N):  Y  Visit# / total visits:       G-Code (if applicable):          PFDI: 121/300    Medicare Cap (if applicable):  n/a = total amount used, updated 2021    Time in:  4:05pm     Timed Treatment: 45min. Total Treatment Time:  45min.  ________________________________________________________________________________________    Pain Level:    0/10  SUBJECTIVE: Patient reports \"I am doing about the same\". \"I am able to use the orange dilator now\". Reports some compliance with HEP. OBJECTIVE:     Exercise (TE) Resistance/Repetitions Other comments            PF strengthening                                  PF relaxation Belly breathing:x10         Hip ADD stretch:x10         Happy Baby Pose:x10                         Other Treatment:   TA:  Bladder re-training/education:         Other: progression of treatment, review of dilator use, progression of dilator use, relaxation, progressing activity with partner for pain management and advancement, perineal massage/stretching    Manual Treatments: external vaginal pressure, 1 finger insert to full length, sustained pressure with VCs for muscle relaxation 7/10 to -2/10, post. Fourchette: 8/10 to -6/10.   patient dilator use with VCs and TCs provided by PT, Patient used gloves to increase comfort ability with dilators. Neuromuscular re-education:       Modalities: --     Test/Measurements:  See eval         ASSESSMENT:  Patient demonstrated good progression with manual therapy this date. Able to use green dilator this date. Making great progression overall, though slow. The patient reported increased pain levels focused on posterior fourchette this date, advised to being perineal massage/stretching. Continues to required VCs and TCs to relax throughout session. Patient is demonstrating decreased stress response with manual therapy and dilator use in clinic. The patient encouraged to increase compliance with HEP. Encouraged patient to continue with progression. Treatment/Activity Tolerance:   [x] Patient tolerated treatment well [] Patient limited by fatique  [] Patient limited by pain [] Patient limited by other medical complications  [] Other:     Goals:    Time Frame: 8-10 weeks. Rehab Potential: good     Goals:  1. Patient will demonstrate independence with HEP to self-manage symptoms. 2. Patient will be able to tolerate internal pelvic muscle examination with pain peaking at less than 5/10 to further assess causes of pelvic floor pain and dysfunction. 3. Patient will consistently be able to use tampons without increased pain to improve hygiene and quality of life. 4. Patient will improve PFDI score by at least 50 points demonstrating improved pelvic floor functioning and quality of life.           Plan: [x] Continue per plan of care [] Alter current plan (see comments)   [] Plan of care initiated [] Hold pending MD visit [] Discharge      Plan for Next Session:  continue manual, pelvic floor stretching    Re-Certification Due Date:    Visit 16    Signature:  Celio Ayala PT, DPT

## 2021-05-13 ENCOUNTER — HOSPITAL ENCOUNTER (OUTPATIENT)
Dept: PHYSICAL THERAPY | Age: 26
Setting detail: THERAPIES SERIES
Discharge: HOME | End: 2021-05-13
Payer: COMMERCIAL

## 2021-05-13 PROCEDURE — 97530 THERAPEUTIC ACTIVITIES: CPT

## 2021-05-13 PROCEDURE — 97110 THERAPEUTIC EXERCISES: CPT

## 2021-05-13 PROCEDURE — 97140 MANUAL THERAPY 1/> REGIONS: CPT

## 2021-05-13 NOTE — FLOWSHEET NOTE
[facility] 79. and Therapy, [facility], [address], [address]  Phone: [phone]  Fax [phone]        Physical Therapy Daily Treatment Note    Date:  2021    Patient Name:  Charles Esposito    :  1995  MRN: [id number]  Restrictions/Precautions: universal    Medical/Treatment Diagnosis Information:  · Diagnosis: F52.5 (ICD-10-CM) - Functional nyidxjrhtyH39.10 (ICD-10-CM) - Dyspareunia, female  Insurance/Certification information:  PT Insurance Information: [employer] - $50 co-pay  Physician Information:  Referring Practitioner: Lauro Mitchell DO  Plan of care signed (Y/N):  Y  Visit# / total visits:       G-Code (if applicable):          PFDI: 121/300    Medicare Cap (if applicable):  n/a = total amount used, updated 2021    Time in:  12:30pm     Timed Treatment: 45min. Total Treatment Time:  45min.  ________________________________________________________________________________________    Pain Level:    0/10  SUBJECTIVE: Patient reports \"I had a hard time with using the green, I had to go back to the orange\". \"I panic with the initial penetration\". am doing about the same\". \"I am able to use the orange dilator now\". Reports some compliance with HEP.     OBJECTIVE:     Exercise (TE) Resistance/Repetitions Other comments            PF strengthening                                  PF relaxation Belly breathing:x10         Hip ADD stretch:x10         Happy Baby Pose:x10                         Other Treatment:   TA:  Bladder re-training/education:         Other: progression of treatment, review of dilator use, progression of dilator use, relaxation, progressing activity with partner for pain management and advancement, perineal massage/stretching    Manual Treatments: external vaginal pressure, 1 finger insert to full length, sustained pressure with VCs for muscle relaxation 6/10 to 1-2/10, post. Fourchette: 7/10 to 5/10. patient dilator use with VCs and TCs provided by PT, Patient used gloves to increase comfort ability with dilators. Neuromuscular re-education:       Modalities: --     Test/Measurements:  See eval         ASSESSMENT:  Patient demonstrated good progression with manual therapy this date. Able to use green dilator this date, though did take it out a few minutes early due to post. Fourchette pain. Advised to increase compliance with perineal massage. Patient did get numbing gel from GYN, will begin use this week. Making great progression overall, though slow. Continues to required VCs and TCs to relax throughout session. Patient is demonstrating decreased stress response with manual therapy and dilator use in clinic. The patient encouraged to increase compliance with HEP. Encouraged patient to continue with progression. Treatment/Activity Tolerance:   [x] Patient tolerated treatment well [] Patient limited by fatique  [] Patient limited by pain [] Patient limited by other medical complications  [] Other:     Goals:    Time Frame: 8-10 weeks. Rehab Potential: good     Goals:  1. Patient will demonstrate independence with HEP to self-manage symptoms. 2. Patient will be able to tolerate internal pelvic muscle examination with pain peaking at less than 5/10 to further assess causes of pelvic floor pain and dysfunction. 3. Patient will consistently be able to use tampons without increased pain to improve hygiene and quality of life. 4. Patient will improve PFDI score by at least 50 points demonstrating improved pelvic floor functioning and quality of life.           Plan: [x] Continue per plan of care [] Alter current plan (see comments)   [] Plan of care initiated [] Hold pending MD visit [] Discharge      Plan for Next Session:  continue manual, pelvic floor stretching    Re-Certification Due Date:    Visit 16    Signature:  Nirav Valera, PT, DPT

## 2021-05-20 ENCOUNTER — HOSPITAL ENCOUNTER (OUTPATIENT)
Dept: PHYSICAL THERAPY | Age: 26
Setting detail: THERAPIES SERIES
Discharge: HOME | End: 2021-05-20
Payer: COMMERCIAL

## 2021-05-20 NOTE — FLOWSHEET NOTE
[facility]. and Therapy, [place] [address] [address]  [address] [place] [address], [address] [place] [address]  Phone: [phone]  Fax [phone]        Physical Therapy  Cancellation/No-show Note  Patient Name:  Jennifer Reyes  :  1995   Date:  2021  Cancels to date: 2  No-shows to date: 0    For today's appointment patient:  [x] Cancelled  [] Rescheduled appointment  [] No-show     Reason given by patient:  [x] Patient ill  [] Conflicting appointment  [] No transportation    [] Conflict with work  [] No reason given  [] Other:     Comments:      Electronically signed by:  Dell Branch PT, PT

## 2021-05-27 ENCOUNTER — HOSPITAL ENCOUNTER (OUTPATIENT)
Dept: PHYSICAL THERAPY | Age: 26
Setting detail: THERAPIES SERIES
End: 2021-05-27
Payer: COMMERCIAL

## 2021-06-03 ENCOUNTER — HOSPITAL ENCOUNTER (OUTPATIENT)
Dept: PHYSICAL THERAPY | Age: 26
Setting detail: THERAPIES SERIES
Discharge: HOME | End: 2021-06-03
Payer: COMMERCIAL

## 2021-06-03 PROCEDURE — 97530 THERAPEUTIC ACTIVITIES: CPT

## 2021-06-03 PROCEDURE — 97110 THERAPEUTIC EXERCISES: CPT

## 2021-06-03 PROCEDURE — 97140 MANUAL THERAPY 1/> REGIONS: CPT

## 2021-06-03 NOTE — FLOWSHEET NOTE
[facility] 79. and Therapy, [facility], [address], [address]  Phone: [phone]  Fax [phone]        Physical Therapy Daily Treatment Note    Date:  6/3/2021    Patient Name:  Marquise Bueno    :  1995  MRN: [id number]  Restrictions/Precautions: universal    Medical/Treatment Diagnosis Information:  · Diagnosis: F52.5 (ICD-10-CM) - Functional gmdxgylegjD00.10 (ICD-10-CM) - Dyspareunia, female  Insurance/Certification information:  PT Insurance Information: [address] [facility] - $50 co-pay  Physician Information:  Referring Practitioner: Milagro Osei DO  Plan of care signed (Y/N):  Y  Visit# / total visits:  10/20     G-Code (if applicable):          PFDI: 121/300    Medicare Cap (if applicable):  n/a = total amount used, updated 6/3/2021    Time in:  3:00pm     Timed Treatment: 45min. Total Treatment Time:  45min.  ________________________________________________________________________________________    Pain Level:    0/10  SUBJECTIVE: Patient reports \"I am doing okay, I have had a really stressful couple of weeks\". \"I was able to use the dilators, but now I am having right-sided pain\". \"I was able to do some stuff with my fiance and it wasn't too bad\". \"I am able to use the green dilator now\". Reports some compliance with HEP.     OBJECTIVE:     Exercise (TE) Resistance/Repetitions Other comments            PF strengthening                                  PF relaxation Belly breathing:x10         Hip ADD stretch:x10         Happy Baby Pose:x10                         Other Treatment:   TA:  Bladder re-training/education:         Other: progression of treatment, review of dilator use, progression of dilator use, relaxation, progressing activity with partner for pain management and advancement, perineal massage/stretching    Manual Treatments: external vaginal pressure, 1 finger insert to full length, sustained pressure

## 2021-06-17 ENCOUNTER — HOSPITAL ENCOUNTER (OUTPATIENT)
Dept: PHYSICAL THERAPY | Age: 26
Setting detail: THERAPIES SERIES
Discharge: HOME | End: 2021-06-17
Payer: COMMERCIAL

## 2021-06-17 PROCEDURE — 97140 MANUAL THERAPY 1/> REGIONS: CPT

## 2021-06-17 PROCEDURE — 97110 THERAPEUTIC EXERCISES: CPT

## 2021-07-01 ENCOUNTER — HOSPITAL ENCOUNTER (OUTPATIENT)
Dept: PHYSICAL THERAPY | Age: 26
Setting detail: THERAPIES SERIES
Discharge: HOME | End: 2021-07-01
Payer: COMMERCIAL

## 2021-07-01 PROCEDURE — 97140 MANUAL THERAPY 1/> REGIONS: CPT

## 2021-07-01 PROCEDURE — 97110 THERAPEUTIC EXERCISES: CPT

## 2021-07-01 PROCEDURE — 97530 THERAPEUTIC ACTIVITIES: CPT

## 2021-07-01 NOTE — FLOWSHEET NOTE
[facility] 79. and Therapy, [facility], [address], [address]  Phone: [phone]  Fax [phone]        Physical Therapy Daily Treatment Note    Date:  2021    Patient Name:  Anthony Truong    :  1995  MRN: [id number]  Restrictions/Precautions: universal    Medical/Treatment Diagnosis Information:  · Diagnosis: F52.5 (ICD-10-CM) - Functional cgpjcxzwajF55.10 (ICD-10-CM) - Dyspareunia, female  Insurance/Certification information:  PT Insurance Information: [address] [facility] - $50 co-pay  Physician Information:  Referring Practitioner: Hawk Wright DO  Plan of care signed (Y/N):  Y  Visit# / total visits:       G-Code (if applicable):          PFDI: 121/300    Medicare Cap (if applicable):  n/a = total amount used, updated 2021    Time in:  8:15am     Timed Treatment: 45min. Total Treatment Time:  45min.  ________________________________________________________________________________________    Pain Level:    0/10  SUBJECTIVE: Patient reports \"I forgot my birth control pill one day and I had my period and cramping for 2 days so I didn't do the dilators those days\". Reports some compliance with HEP. OBJECTIVE:     Exercise (TE) Resistance/Repetitions Other comments            PF strengthening                                  PF relaxation Belly breathing:x10         Hip ADD stretch:x10         Happy Baby Pose:x10                         Other Treatment:   TA:  Bladder re-training/education:         Other: progression of treatment, review of dilator use, progression of dilator use, relaxation, progressing activity with partner for pain management and advancement, perineal massage/stretching    Manual Treatments: external vaginal pressure, 1 finger insert to full length, sustained pressure with VCs for muscle relaxation 3/10 to 1/10, posterior Fourchette: 5/10 to 3-4/10.   patient dilator use with VCs and TCs provided by PT, Patient used gloves to increase comfort ability with dilators. Neuromuscular re-education:       Modalities: --     Test/Measurements:  See eval         ASSESSMENT:  The patient continues to progress well from session to session. Has been very compliant with HEP. Patient will move up to blue dilator over the next 2 weeks. Patient demonstrated good progression with manual therapy this date. Advised to increase compliance with perineal massage, using the CBD lubricant/numbing gel as she desires. Continues to required VCs and TCs to relax throughout session. Patient continues to demonstrate decreased stress response with manual therapy and dilator use in clinic. Encouraged patient to continue with progression as stated above. Treatment/Activity Tolerance:   [x] Patient tolerated treatment well [] Patient limited by fatique  [] Patient limited by pain [] Patient limited by other medical complications  [] Other:     Goals:    Time Frame: 8-10 weeks. Rehab Potential: good     Goals:  1. Patient will demonstrate independence with HEP to self-manage symptoms. 2. Patient will be able to tolerate internal pelvic muscle examination with pain peaking at less than 5/10 to further assess causes of pelvic floor pain and dysfunction. 3. Patient will consistently be able to use tampons without increased pain to improve hygiene and quality of life. 4. Patient will improve PFDI score by at least 50 points demonstrating improved pelvic floor functioning and quality of life.           Plan: [x] Continue per plan of care [] Alter current plan (see comments)   [] Plan of care initiated [] Hold pending MD visit [] Discharge      Plan for Next Session:  continue manual, pelvic floor stretching    Re-Certification Due Date:    Visit 16    Signature:  Ruba Robles, PT, DPT

## 2021-07-15 ENCOUNTER — TELEPHONE (OUTPATIENT)
Dept: OBGYN CLINIC | Age: 26
End: 2021-07-15

## 2021-07-15 ENCOUNTER — APPOINTMENT (OUTPATIENT)
Dept: PHYSICAL THERAPY | Age: 26
End: 2021-07-15
Payer: COMMERCIAL

## 2021-07-15 NOTE — TELEPHONE ENCOUNTER
Patient concerned with discharge, itching and odor. Believes she has a yeast infection. Last PAP 1/29/21. Please advise, pended medication, verified pharmacy.      Routing to Dr. Angela Hollingsworth

## 2021-07-16 RX ORDER — FLUCONAZOLE 150 MG/1
150 TABLET ORAL ONCE
Qty: 1 TABLET | Refills: 1 | Status: SHIPPED | OUTPATIENT
Start: 2021-07-16 | End: 2021-07-16

## 2021-07-16 NOTE — TELEPHONE ENCOUNTER
[phone] ([place])       Placed call to patient, no answer, left VM to return call to office.

## 2021-07-20 NOTE — TELEPHONE ENCOUNTER
Please check on patient to make sure symptoms have resolved from last week when we were unable to get in touch with her.   Thanks

## 2021-07-29 ENCOUNTER — HOSPITAL ENCOUNTER (OUTPATIENT)
Dept: PHYSICAL THERAPY | Age: 26
Setting detail: THERAPIES SERIES
Discharge: HOME | End: 2021-07-29
Payer: COMMERCIAL

## 2021-07-29 PROCEDURE — 97530 THERAPEUTIC ACTIVITIES: CPT

## 2021-07-29 PROCEDURE — 97110 THERAPEUTIC EXERCISES: CPT

## 2021-07-29 PROCEDURE — 97140 MANUAL THERAPY 1/> REGIONS: CPT

## 2021-07-29 NOTE — FLOWSHEET NOTE
[address] [place] [address] [facility],  [address], [address] [place] [address] Dr  Phone: [phone]  Fax [phone]        Physical Therapy Daily Treatment Note    Date:  2021    Patient Name:  Duc Verdin    :  1995  MRN: [id number]  Restrictions/Precautions: universal    Medical/Treatment Diagnosis Information:  · Diagnosis: F52.5 (ICD-10-CM) - Functional [id number] (ICD-10-CM) - Dyspareunia, female  Insurance/Certification information:  PT Insurance Information: [address] [facility] - $50 co-pay  Physician Information:  Referring Practitioner: Ronak Rosas DO  Plan of care signed (Y/N):  Y  Visit# / total visits:       G-Code (if applicable):          PFDI: 121/300    Medicare Cap (if applicable):  n/a = total amount used, updated 2021    Time in:  8:15am     Timed Treatment: 45min. Total Treatment Time:  45min.  ________________________________________________________________________________________    Pain Level:    0/10  SUBJECTIVE: Patient reports \"I am doing okay, I have been using the dilators and I am on the blue one mostly\". Reports compliance with HEP. OBJECTIVE:     Exercise (TE) Resistance/Repetitions Other comments            PF strengthening                                  PF relaxation Belly breathing:x10         Hip ADD stretch:x10         Happy Baby Pose:x10                         Other Treatment:   TA:  Bladder re-training/education:         Other: progression of treatment, review of dilator use, progression of dilator use, relaxation, progressing activity with partner for pain management and advancement, perineal massage/stretching    Manual Treatments: external vaginal pressure, 1 finger insert to full length, sustained pressure with VCs for muscle relaxation 2/10 to 1/10, posterior Fourchette: 4/10 to 3/10.   patient dilator use with VCs and TCs provided by PT, Patient used gloves to increase comfort ability with dilators. Neuromuscular re-education:       Modalities: --     Test/Measurements:  See eval         ASSESSMENT:  The patient continues to progress well from session to session. Has been very compliant with HEP. Patient will move up to blue/purple dilator over the next 2 weeks. Patient demonstrated good progression with manual therapy this date. Advised to increase compliance with perineal massage, using the CBD lubricant/numbing gel as she desires. Continues to required VCs and TCs to relax throughout session. Patient continues to demonstrate decreased stress response with manual therapy and dilator use in clinic. Treatment/Activity Tolerance:   [x] Patient tolerated treatment well [] Patient limited by fatique  [] Patient limited by pain [] Patient limited by other medical complications  [] Other:     Goals:    Time Frame: 8-10 weeks. Rehab Potential: good     Goals:  1. Patient will demonstrate independence with HEP to self-manage symptoms. 2. Patient will be able to tolerate internal pelvic muscle examination with pain peaking at less than 5/10 to further assess causes of pelvic floor pain and dysfunction. 3. Patient will consistently be able to use tampons without increased pain to improve hygiene and quality of life. 4. Patient will improve PFDI score by at least 50 points demonstrating improved pelvic floor functioning and quality of life.           Plan: [x] Continue per plan of care [] Alter current plan (see comments)   [] Plan of care initiated [] Hold pending MD visit [] Discharge      Plan for Next Session:  continue manual, pelvic floor stretching    Re-Certification Due Date:    Visit 16    Signature:  Evin Angel PT, DPT

## 2021-08-10 ENCOUNTER — HOSPITAL ENCOUNTER (OUTPATIENT)
Dept: PHYSICAL THERAPY | Age: 26
Setting detail: THERAPIES SERIES
Discharge: HOME | End: 2021-08-10
Payer: COMMERCIAL

## 2021-08-10 PROCEDURE — 97140 MANUAL THERAPY 1/> REGIONS: CPT

## 2021-08-10 PROCEDURE — 97530 THERAPEUTIC ACTIVITIES: CPT

## 2021-08-10 PROCEDURE — 97110 THERAPEUTIC EXERCISES: CPT

## 2021-08-10 NOTE — FLOWSHEET NOTE
to 2/10, posterior Fourchette: 4/10 to 4/10. patient dilator use with VCs and TCs provided by PT, Patient used gloves to increase comfort ability with dilators. Neuromuscular re-education:       Modalities: --     Test/Measurements:  See eval         ASSESSMENT:  The patient presented with new complaints of burning in the vagina. Suggested patient make a follow up with MD to rule out infection and also to potentially discuss the benefits of topical estrogen? The patient continues to progress well from session to session, though a bit of a set back this date due to burning complaints. Has been very compliant with HEP. Continues to required VCs and TCs to relax throughout session. Patient continues to demonstrate decreased stress response with manual therapy and dilator use in clinic. Treatment/Activity Tolerance:   [x] Patient tolerated treatment well [] Patient limited by fatique  [] Patient limited by pain [] Patient limited by other medical complications  [] Other:     Goals:    Time Frame: 8-10 weeks. Rehab Potential: good     Goals:  1. Patient will demonstrate independence with HEP to self-manage symptoms. 2. Patient will be able to tolerate internal pelvic muscle examination with pain peaking at less than 5/10 to further assess causes of pelvic floor pain and dysfunction. 3. Patient will consistently be able to use tampons without increased pain to improve hygiene and quality of life. 4. Patient will improve PFDI score by at least 50 points demonstrating improved pelvic floor functioning and quality of life.           Plan: [x] Continue per plan of care [] Alter current plan (see comments)   [] Plan of care initiated [] Hold pending MD visit [] Discharge      Plan for Next Session:  continue manual, pelvic floor stretching    Re-Certification Due Date:    Visit 16    Signature:  Ruba Robles, PT, DPT

## 2021-08-18 ENCOUNTER — VIRTUAL VISIT (OUTPATIENT)
Dept: FAMILY MEDICINE CLINIC | Age: 26
End: 2021-08-18
Payer: COMMERCIAL

## 2021-08-18 DIAGNOSIS — F41.9 ANXIETY: Primary | ICD-10-CM

## 2021-08-18 DIAGNOSIS — E78.41 ELEVATED LIPOPROTEIN(A): ICD-10-CM

## 2021-08-18 PROCEDURE — 1036F TOBACCO NON-USER: CPT | Performed by: NURSE PRACTITIONER

## 2021-08-18 PROCEDURE — 99214 OFFICE O/P EST MOD 30 MIN: CPT | Performed by: NURSE PRACTITIONER

## 2021-08-18 PROCEDURE — G8427 DOCREV CUR MEDS BY ELIG CLIN: HCPCS | Performed by: NURSE PRACTITIONER

## 2021-08-18 PROCEDURE — G8420 CALC BMI NORM PARAMETERS: HCPCS | Performed by: NURSE PRACTITIONER

## 2021-08-18 RX ORDER — ESCITALOPRAM OXALATE 5 MG/1
5 TABLET ORAL DAILY
Qty: 60 TABLET | Refills: 0 | Status: SHIPPED | OUTPATIENT
Start: 2021-08-18 | End: 2021-09-07 | Stop reason: SDUPTHER

## 2021-08-18 RX ORDER — HYDROXYZINE HYDROCHLORIDE 25 MG/1
25 TABLET, FILM COATED ORAL EVERY 8 HOURS PRN
Qty: 30 TABLET | Refills: 0 | Status: SHIPPED | OUTPATIENT
Start: 2021-08-18 | End: 2021-09-17

## 2021-08-18 NOTE — PROGRESS NOTES
2021    TELEHEALTH EVALUATION -- Audio/Visual [facility])    HPI:    Tasha Hummel (:  1995) has requested an audio/video evaluation for the following concern(s):    HPI    Anxiety: She stopped taking her 25 mg of Zoloft. She stopped taking it last . These past few months anxiety got worse. She has been using hydroxyzine PRN. She would like to try another med. We will try lexapro and hydroxyzine PRN. It appears she stopped taking her cholesterol medicine as well. We had not gotten labs on her cholesterol since 2020. I will place these orders for her to come in and get. Review of Systems    Prior to Visit Medications    Medication Sig Taking?  Authorizing Provider   escitalopram (LEXAPRO) 5 MG tablet Take 1 tablet by mouth daily Yes NAVARRO Garcia CNP   hydrOXYzine (ATARAX) 25 MG tablet Take 1 tablet by mouth every 8 hours as needed for Anxiety Yes NAVARRO Garcia CNP   levonorgestrel-ethinyl estradiol (SEASONALE) 0.15-0.03 MG per tablet TAKE 1 TABLET BY MOUTH  DAILY Yes Ronel Gordoelizabeth Ronquillo DO   glycopyrrolate (ROBINUL) 1 MG tablet Take 1 mg by mouth 2 times daily  Yes Historical Provider, MD lutzaiFENesin (MUCINEX) 600 MG extended release tablet Take 1 tablet by mouth 2 times daily Yes NAVARRO Houston CNP   dibucaine 1 % perianal ointment Place around the anus 4 times daily as needed for Pain  Ronel Gordoelizabeth Ronquillo DO   sertraline (ZOLOFT) 25 MG tablet Take 1 tablet by mouth daily  Patient not taking: Reported on 2021  NAVARRO Garcia CNP   atorvastatin (LIPITOR) 10 MG tablet Take 1 tablet by mouth daily  Patient not taking: Reported on 2021  NAVARRO Garcia CNP       Social History     Tobacco Use    Smoking status: Never Smoker    Smokeless tobacco: Never Used   Vaping Use    Vaping Use: Never used   Substance Use Topics    Alcohol use: No    Drug use: No        PHYSICAL EXAMINATION:  [ INSTRUCTIONS: \"[x]\" Indicates a positive item  \"[]\" Indicates a negative item  -- DELETE ALL ITEMS NOT EXAMINED]  Vital Signs: (As obtained by patient/caregiver or practitioner observation)    Constitutional: [x] Appears well-developed and well-nourished [x] No apparent distress      [] Abnormal-   Mental status  [x] Alert and awake  [] Oriented to person/place/time [x]Able to follow commands      Eyes:  EOM    [x]  Normal  [] Abnormal-  Sclera  [x]  Normal  [] Abnormal -         Discharge [x]  None visible  [] Abnormal -    HENT:   [x] Normocephalic, atraumatic. [] Abnormal   [x] Mouth/Throat: Mucous membranes are moist.     External Ears [x] Normal  [] Abnormal-     Neck: [x] No visualized mass     Pulmonary/Chest: [x] Respiratory effort normal.  [x] No visualized signs of difficulty breathing or respiratory distress        [] Abnormal-      Musculoskeletal:   [x] Normal gait with no signs of ataxia         [x] Normal range of motion of neck        [] Abnormal-       Neurological:        [x] No Facial Asymmetry (Cranial nerve 7 motor function) (limited exam to video visit)          [x] No gaze palsy        [] Abnormal-         Skin:        [x] No significant exanthematous lesions or discoloration noted on facial skin         [] Abnormal-            Psychiatric:       [x] Normal Affect [x] No Hallucinations        [] Abnormal- Appears tired    Other pertinent observable physical exam findings-     Due to this being a TeleHealth encounter, evaluation of the following organ systems is limited: Vitals/Constitutional/EENT/Resp/CV/GI//MS/Neuro/Skin/Heme-Lymph-Imm. ASSESSMENT/PLAN:  1. Anxiety    - Lipid Panel; Future  - Comprehensive Metabolic Panel; Future  - escitalopram (LEXAPRO) 5 MG tablet; Take 1 tablet by mouth daily  Dispense: 60 tablet; Refill: 0  - hydrOXYzine (ATARAX) 25 MG tablet; Take 1 tablet by mouth every 8 hours as needed for Anxiety  Dispense: 30 tablet; Refill: 0    2.  Elevated lipoprotein(a)    - Lipid

## 2021-08-19 ENCOUNTER — TELEPHONE (OUTPATIENT)
Dept: OBGYN CLINIC | Age: 26
End: 2021-08-19

## 2021-08-19 RX ORDER — ESTRADIOL 0.1 MG/G
1 CREAM VAGINAL
Qty: 1 TUBE | Refills: 1 | Status: SHIPPED | OUTPATIENT
Start: 2021-08-19

## 2021-08-19 NOTE — TELEPHONE ENCOUNTER
Rx for vaginal estrogen sent to listed pharmacy. Please advise patient that this would definitely be something to try. Please have her check the cost before filling the medication. Thanks.

## 2021-08-20 NOTE — TELEPHONE ENCOUNTER
[phone] ([place])     Placed call to patient, verbalized understanding. No questions or concerns voiced.

## 2021-08-26 ENCOUNTER — HOSPITAL ENCOUNTER (OUTPATIENT)
Dept: PHYSICAL THERAPY | Age: 26
Setting detail: THERAPIES SERIES
End: 2021-08-26
Payer: COMMERCIAL

## 2021-09-02 ENCOUNTER — HOSPITAL ENCOUNTER (OUTPATIENT)
Dept: PHYSICAL THERAPY | Age: 26
Setting detail: THERAPIES SERIES
Discharge: HOME | End: 2021-09-02
Payer: COMMERCIAL

## 2021-09-02 PROCEDURE — 97530 THERAPEUTIC ACTIVITIES: CPT

## 2021-09-02 PROCEDURE — 97140 MANUAL THERAPY 1/> REGIONS: CPT

## 2021-09-02 PROCEDURE — 97110 THERAPEUTIC EXERCISES: CPT

## 2021-09-02 NOTE — FLOWSHEET NOTE
[facility], [address], [address]  Phone: [phone]  Fax [phone]        Physical Therapy Daily Treatment Note    Date:  2021    Patient Name:  Elizabeth Carlton    :  1995  MRN: [id number]  Restrictions/Precautions: universal    Medical/Treatment Diagnosis Information:  · Diagnosis: F52.5 (ICD-10-CM) - Functional [id number] (ICD-10-CM) - Dyspareunia, female  Insurance/Certification information:  PT Insurance Information: [address] [facility] - $50 co-pay  Physician Information:  Referring Practitioner: Timmy Prieto DO  Plan of care signed (Y/N):  Y  Visit# / total visits:       G-Code (if applicable):          PFDI: 121/300    Medicare Cap (if applicable):  n/a = total amount used, updated 2021    Time in:  10:30am     Timed Treatment: 45min. Total Treatment Time:  45min.  ________________________________________________________________________________________    Pain Level:    0/10  SUBJECTIVE: Patient reports \"I am feeling much better and doing better\". Reports \"I started using the purple more and I can get that in [facility] and I am using the estrogen 2 times per week and that has helped a lot because the burning has gotten better\". Reports compliance with HEP. OBJECTIVE:     Exercise (TE) Resistance/Repetitions Other comments            PF strengthening                                  PF relaxation Belly breathing:x10         Hip ADD stretch:x10         Happy Baby Pose:x10                         Other Treatment:   TA:  Bladder re-training/education:         Other: progression of treatment, review of dilator use, progression of dilator use, relaxation, progressing activity with partner for pain management and advancement, perineal massage/stretching, s/s of infection, estrogen benefits, using dilators in a variety of positions.     Manual Treatments: external vaginal pressure, 1 finger insert to full length, sustained pressure with VCs for muscle relaxation 2/10 to 1/10, posterior Fourchette: 3/10 to 1-2/10.  2 finger insert to first knuckle with sustained pressure and VCs for relaxation 3/10 to 2/10. patient dilator use with VCs and TCs provided by PT, Patient used gloves to increase comfort ability with dilators. Neuromuscular re-education:       Modalities: --     Test/Measurements:  See eval         ASSESSMENT:  The patient continues to progress well from session to session, has progressed significantly since last visit. Has been very compliant with HEP. Continues to required VCs and TCs to relax throughout session. Patient continues to demonstrate decreased stress response with manual therapy and dilator use in clinic. Treatment/Activity Tolerance:   [x] Patient tolerated treatment well [] Patient limited by fatique  [] Patient limited by pain [] Patient limited by other medical complications  [] Other:     Goals:    Time Frame: 8-10 weeks. Rehab Potential: good     Goals:  1. Patient will demonstrate independence with HEP to self-manage symptoms. 2. Patient will be able to tolerate internal pelvic muscle examination with pain peaking at less than 5/10 to further assess causes of pelvic floor pain and dysfunction. 3. Patient will consistently be able to use tampons without increased pain to improve hygiene and quality of life. 4. Patient will improve PFDI score by at least 50 points demonstrating improved pelvic floor functioning and quality of life.           Plan: [x] Continue per plan of care [] Alter current plan (see comments)   [] Plan of care initiated [] Hold pending MD visit [] Discharge      Plan for Next Session:  continue manual, pelvic floor stretching    Re-Certification Due Date:    Visit 16    Signature:  Diony Hurt, PT, DPT

## 2021-09-07 ENCOUNTER — OFFICE VISIT (OUTPATIENT)
Dept: FAMILY MEDICINE CLINIC | Age: 26
End: 2021-09-07
Payer: COMMERCIAL

## 2021-09-07 VITALS
OXYGEN SATURATION: 98 % | HEART RATE: 83 BPM | SYSTOLIC BLOOD PRESSURE: 102 MMHG | WEIGHT: 133 LBS | HEIGHT: 65 IN | DIASTOLIC BLOOD PRESSURE: 68 MMHG | BODY MASS INDEX: 22.16 KG/M2

## 2021-09-07 DIAGNOSIS — E78.41 ELEVATED LIPOPROTEIN(A): ICD-10-CM

## 2021-09-07 DIAGNOSIS — F41.9 ANXIETY: Primary | ICD-10-CM

## 2021-09-07 PROCEDURE — G8420 CALC BMI NORM PARAMETERS: HCPCS | Performed by: NURSE PRACTITIONER

## 2021-09-07 PROCEDURE — 99214 OFFICE O/P EST MOD 30 MIN: CPT | Performed by: NURSE PRACTITIONER

## 2021-09-07 PROCEDURE — G8427 DOCREV CUR MEDS BY ELIG CLIN: HCPCS | Performed by: NURSE PRACTITIONER

## 2021-09-07 PROCEDURE — 36415 COLL VENOUS BLD VENIPUNCTURE: CPT | Performed by: NURSE PRACTITIONER

## 2021-09-07 PROCEDURE — 1036F TOBACCO NON-USER: CPT | Performed by: NURSE PRACTITIONER

## 2021-09-07 RX ORDER — ESCITALOPRAM OXALATE 5 MG/1
5 TABLET ORAL DAILY
Qty: 90 TABLET | Refills: 0 | Status: SHIPPED | OUTPATIENT
Start: 2021-09-07 | End: 2021-12-06 | Stop reason: SDUPTHER

## 2021-09-07 RX ORDER — SPIRONOLACTONE 25 MG/1
25 TABLET ORAL 2 TIMES DAILY
COMMUNITY
Start: 2021-07-08 | End: 2022-07-07

## 2021-09-07 ASSESSMENT — ENCOUNTER SYMPTOMS
EYE DISCHARGE: 0
STRIDOR: 0
EYE REDNESS: 0
CHOKING: 0
SHORTNESS OF BREATH: 0
SORE THROAT: 0
CHEST TIGHTNESS: 0
VOMITING: 0
ABDOMINAL PAIN: 0
WHEEZING: 0
COUGH: 0
RHINORRHEA: 0
BACK PAIN: 0
EYE PAIN: 0
EYE ITCHING: 0
SINUS PAIN: 0
BLOOD IN STOOL: 0
NAUSEA: 0
SINUS PRESSURE: 0
COLOR CHANGE: 0
CONSTIPATION: 0
TROUBLE SWALLOWING: 0
DIARRHEA: 0
VOICE CHANGE: 0
PHOTOPHOBIA: 0

## 2021-09-07 NOTE — PROGRESS NOTES
Chief Complaint   Patient presents with    Anxiety       /68   Pulse 83   Ht 5' 5\" (1.651 m)   Wt 133 lb (60.3 kg)   SpO2 98%   BMI 22.13 kg/m²     HPI:  Kailyn Thornton is a 27 y.o. (: 1995) here today   for   HPI    Patient's medications, allergies, past medical, surgical, social and family histories were reviewed and updated as appropriate. Anxiety: We started her on Lexapro 5 mg and Hydroxyzine PRN. We will stay on the 5 mg. She is sleeping better and able to fall asleep. HLD: She is due for labs. Will get today in office. ROS:  Review of Systems   Constitutional: Negative for activity change, appetite change, chills, diaphoresis, fatigue, fever and unexpected weight change. HENT: Negative for congestion, ear discharge, ear pain, hearing loss, nosebleeds, postnasal drip, rhinorrhea, sinus pressure, sinus pain, sneezing, sore throat, tinnitus, trouble swallowing and voice change. Eyes: Negative for photophobia, pain, discharge, redness and itching. Respiratory: Negative for cough, choking, chest tightness, shortness of breath, wheezing and stridor. Cardiovascular: Negative for chest pain, palpitations and leg swelling. Gastrointestinal: Negative for abdominal pain, blood in stool, constipation, diarrhea, nausea and vomiting. Endocrine: Negative for cold intolerance, heat intolerance, polydipsia and polyuria. Genitourinary: Negative for difficulty urinating, dysuria, enuresis, flank pain, frequency, hematuria and urgency. Musculoskeletal: Negative for back pain, gait problem, joint swelling, neck pain and neck stiffness. Skin: Negative for color change, pallor, rash and wound. Allergic/Immunologic: Negative for environmental allergies and food allergies. Neurological: Negative for dizziness, tremors, syncope, speech difficulty, weakness, light-headedness, numbness and headaches. Hematological: Negative for adenopathy. Does not bruise/bleed easily. Psychiatric/Behavioral: Negative for agitation, behavioral problems, confusion, decreased concentration, dysphoric mood, hallucinations, self-injury, sleep disturbance and suicidal ideas. The patient is nervous/anxious. The patient is not hyperactive. Sleeping better         Prior to Visit Medications    Medication Sig Taking? Authorizing Provider   escitalopram (LEXAPRO) 5 MG tablet Take 1 tablet by mouth daily Yes NAVARRO Rosen CNP   estradiol (ESTRACE VAGINAL) 0.1 MG/GM vaginal cream Place 1 g vaginally Twice a Week Yes Александр Ronquillo DO   hydrOXYzine (ATARAX) 25 MG tablet Take 1 tablet by mouth every 8 hours as needed for Anxiety Yes NAVARRO Rosen CNP   levonorgestrel-ethinyl estradiol (SEASONALE) 0.15-0.03 MG per tablet TAKE 1 TABLET BY MOUTH  DAILY Yes Александр Ronquillo DO   dibucaine 1 % perianal ointment Place around the anus 4 times daily as needed for Pain Yes Александр Ronquillo DO   glycopyrrolate (ROBINUL) 1 MG tablet Take 1 mg by mouth 2 times daily  Yes Historical Provider, MD   atorvastatin (LIPITOR) 10 MG tablet Take 1 tablet by mouth daily Yes NAVARRO Rosen CNP   spironolactone (ALDACTONE) 25 MG tablet Take 25 mg by mouth 2 times daily  Historical Provider, MD       No Known Allergies    OBJECTIVE:      BP Readings from Last 2 Encounters:   09/07/21 102/68   01/29/21 116/76       Wt Readings from Last 3 Encounters:   09/07/21 133 lb (60.3 kg)   01/29/21 136 lb (61.7 kg)   10/08/20 140 lb 3.2 oz (63.6 kg)       Physical Exam  Vitals reviewed. Constitutional:       General: She is not in acute distress. Appearance: Normal appearance. She is well-developed. HENT:      Head: Normocephalic and atraumatic. Right Ear: Hearing normal.      Left Ear: Hearing normal.      Nose:      Right Sinus: No maxillary sinus tenderness or frontal sinus tenderness. Left Sinus: No maxillary sinus tenderness or frontal sinus tenderness.    Eyes:      General: Right eye: No discharge. Left eye: No discharge. Neck:      Thyroid: No thyromegaly. Vascular: No JVD. Trachea: No tracheal deviation. Cardiovascular:      Rate and Rhythm: Normal rate and regular rhythm. Heart sounds: Normal heart sounds. No murmur heard. No friction rub. Pulmonary:      Effort: Pulmonary effort is normal. No respiratory distress. Breath sounds: Normal breath sounds. No stridor. No decreased breath sounds, wheezing, rhonchi or rales. Musculoskeletal:         General: No tenderness. Normal range of motion. Cervical back: Normal range of motion. Lymphadenopathy:      Cervical: No cervical adenopathy. Skin:     General: Skin is warm and dry. Capillary Refill: Capillary refill takes less than 2 seconds. Findings: No rash. Neurological:      Mental Status: She is alert and oriented to person, place, and time. Sensory: Sensation is intact. Motor: Motor function is intact. Coordination: Coordination normal.   Psychiatric:         Attention and Perception: Attention and perception normal.         Mood and Affect: Mood normal.         Speech: Speech normal.         Behavior: Behavior normal. Behavior is cooperative. Thought Content: Thought content normal.         Cognition and Memory: Cognition normal.         Judgment: Judgment normal.      Comments: Sleeping better       ASSESSMENT/PLAN:    1. Anxiety    - Comprehensive Metabolic Panel  - Lipid Panel  - escitalopram (LEXAPRO) 5 MG tablet; Take 1 tablet by mouth daily  Dispense: 90 tablet; Refill: 0  - Patient is doing well on current medical regimen and will continue current medical regimen at this time. 2. Elevated lipoprotein(a)    - Comprehensive Metabolic Panel  - Lipid Panel    Will follow up with lab results. She will follow up in 3 months for anxiety and cholesterol check up.

## 2021-09-08 LAB
A/G RATIO: 1.6 (ref 1.1–2.2)
ALBUMIN SERPL-MCNC: 4.4 G/DL (ref 3.4–5)
ALP BLD-CCNC: 65 U/L (ref 40–129)
ALT SERPL-CCNC: 7 U/L (ref 10–40)
ANION GAP SERPL CALCULATED.3IONS-SCNC: 14 MMOL/L (ref 3–16)
AST SERPL-CCNC: 11 U/L (ref 15–37)
BILIRUB SERPL-MCNC: 0.4 MG/DL (ref 0–1)
BUN BLDV-MCNC: 6 MG/DL (ref 7–20)
CALCIUM SERPL-MCNC: 8.8 MG/DL (ref 8.3–10.6)
CHLORIDE BLD-SCNC: 103 MMOL/L (ref 99–110)
CHOLESTEROL, TOTAL: 237 MG/DL (ref 0–199)
CO2: 21 MMOL/L (ref 21–32)
CREAT SERPL-MCNC: 0.6 MG/DL (ref 0.6–1.1)
GFR AFRICAN AMERICAN: >60
GFR NON-AFRICAN AMERICAN: >60
GLOBULIN: 2.7 G/DL
GLUCOSE BLD-MCNC: 74 MG/DL (ref 70–99)
HDLC SERPL-MCNC: 68 MG/DL (ref 40–60)
LDL CHOLESTEROL CALCULATED: 157 MG/DL
POTASSIUM SERPL-SCNC: 4.2 MMOL/L (ref 3.5–5.1)
SODIUM BLD-SCNC: 138 MMOL/L (ref 136–145)
TOTAL PROTEIN: 7.1 G/DL (ref 6.4–8.2)
TRIGL SERPL-MCNC: 58 MG/DL (ref 0–150)
VLDLC SERPL CALC-MCNC: 12 MG/DL

## 2021-09-09 DIAGNOSIS — E78.41 ELEVATED LIPOPROTEIN(A): ICD-10-CM

## 2021-09-09 RX ORDER — ATORVASTATIN CALCIUM 10 MG/1
10 TABLET, FILM COATED ORAL DAILY
Qty: 90 TABLET | Refills: 1 | Status: SHIPPED | OUTPATIENT
Start: 2021-09-09

## 2021-09-21 ENCOUNTER — HOSPITAL ENCOUNTER (OUTPATIENT)
Dept: PHYSICAL THERAPY | Age: 26
Setting detail: THERAPIES SERIES
Discharge: HOME | End: 2021-09-21
Payer: COMMERCIAL

## 2021-09-21 PROCEDURE — 97140 MANUAL THERAPY 1/> REGIONS: CPT

## 2021-09-21 PROCEDURE — 97530 THERAPEUTIC ACTIVITIES: CPT

## 2021-09-21 PROCEDURE — 97110 THERAPEUTIC EXERCISES: CPT

## 2021-09-21 NOTE — FLOWSHEET NOTE
[facility], [facility], [address], [address]  Phone: [phone]  Fax [phone]        Physical Therapy Daily Treatment Note    Date:  2021    Patient Name:  Malinda Mendoza    :  1995  MRN: [id number]  Restrictions/Precautions: universal    Medical/Treatment Diagnosis Information:  · Diagnosis: F52.5 (ICD-10-CM) - Functional [id number] (ICD-10-CM) - Dyspareunia, female  Insurance/Certification information:  PT Insurance Information: [address] [facility] - $50 co-pay  Physician Information:  Referring Practitioner: Lynne Romero DO  Plan of care signed (Y/N):  Y  Visit# / total visits:  15/20     G-Code (if applicable):          PFDI: 121/300    Medicare Cap (if applicable):  n/a = total amount used, updated 2021    Time in:  9:45am     Timed Treatment: 45min. Total Treatment Time:  45min.  ________________________________________________________________________________________    Pain Level:    0/10  SUBJECTIVE: Patient reports \"I am doing better, I am able to get the purple in further than last time\". Reports she has continued to use the topical estrogen and is having good results from that. Reports compliance with HEP. OBJECTIVE:     Exercise (TE) Resistance/Repetitions Other comments            PF strengthening                                  PF relaxation Belly breathing:x10         Hip ADD stretch:x10         Happy Baby Pose:x10                         Other Treatment:   TA:  Bladder re-training/education:         Other: progression of treatment, review of dilator use, progression of dilator use, relaxation, progressing activity with partner for pain management and advancement, perineal massage/stretching, s/s of infection, estrogen benefits, using dilators in a variety of positions.     Manual Treatments: external vaginal pressure, 1 finger insert to full length, sustained pressure with VCs for muscle relaxation 1/10 to 0/10, posterior Fourchette: 3/10 to 1-2/10.  2 finger insert to full length with sustained pressure and VCs for relaxation 3/10 to 0-1/10. patient dilator use with VCs and TCs provided by PT,   Neuromuscular re-education:       Modalities: --     Test/Measurements:  See eval         ASSESSMENT:  The patient continues to progress well from session to session, has progressed significantly since last visit. Has been very compliant with HEP. Patient continues to demonstrate decreased stress response with manual therapy and dilator use in clinic, though is showing significant improvement overall. This weekend is wedding. Treatment/Activity Tolerance:   [x] Patient tolerated treatment well [] Patient limited by fatique  [] Patient limited by pain [] Patient limited by other medical complications  [] Other:     Goals:    Time Frame: 8-10 weeks. Rehab Potential: good     Goals:  1. Patient will demonstrate independence with HEP to self-manage symptoms. 2. Patient will be able to tolerate internal pelvic muscle examination with pain peaking at less than 5/10 to further assess causes of pelvic floor pain and dysfunction. 3. Patient will consistently be able to use tampons without increased pain to improve hygiene and quality of life. 4. Patient will improve PFDI score by at least 50 points demonstrating improved pelvic floor functioning and quality of life.           Plan: [x] Continue per plan of care [] Alter current plan (see comments)   [] Plan of care initiated [] Hold pending MD visit [] Discharge      Plan for Next Session:  continue manual, pelvic floor stretching    Re-Certification Due Date:    Visit 16    Signature:  Jovany Velazquez, PT, DPT

## 2021-10-07 ENCOUNTER — HOSPITAL ENCOUNTER (OUTPATIENT)
Dept: PHYSICAL THERAPY | Age: 26
Setting detail: THERAPIES SERIES
Discharge: HOME | End: 2021-10-07
Payer: COMMERCIAL

## 2021-10-07 NOTE — FLOWSHEET NOTE
[facility] Therapy, [facility], [address], [address]  Phone: [phone]  Fax [phone]        Physical Therapy  Cancellation/No-show Note  Patient Name:  Nicole Wallace  :  1995   Date:  10/7/2021  Cancels to date: 3  No-shows to date: 0    For today's appointment patient:  [x] Cancelled  [] Rescheduled appointment  [] No-show     Reason given by patient:  [] Patient ill  [] Conflicting appointment  [] No transportation    [] Conflict with work  [x] No reason given  [] Other:     Comments:      Electronically signed by:  Racheal Ryan, PT, DPT

## 2021-12-06 ENCOUNTER — OFFICE VISIT (OUTPATIENT)
Dept: FAMILY MEDICINE CLINIC | Age: 26
End: 2021-12-06
Payer: COMMERCIAL

## 2021-12-06 VITALS
OXYGEN SATURATION: 99 % | HEIGHT: 65 IN | WEIGHT: 137.8 LBS | BODY MASS INDEX: 22.96 KG/M2 | DIASTOLIC BLOOD PRESSURE: 76 MMHG | SYSTOLIC BLOOD PRESSURE: 104 MMHG | HEART RATE: 102 BPM

## 2021-12-06 DIAGNOSIS — F41.9 ANXIETY: Primary | ICD-10-CM

## 2021-12-06 DIAGNOSIS — Z23 NEED FOR IMMUNIZATION AGAINST INFLUENZA: ICD-10-CM

## 2021-12-06 DIAGNOSIS — K58.0 IRRITABLE BOWEL SYNDROME WITH DIARRHEA: ICD-10-CM

## 2021-12-06 DIAGNOSIS — E78.49 OTHER HYPERLIPIDEMIA: ICD-10-CM

## 2021-12-06 PROCEDURE — G8427 DOCREV CUR MEDS BY ELIG CLIN: HCPCS | Performed by: FAMILY MEDICINE

## 2021-12-06 PROCEDURE — G8420 CALC BMI NORM PARAMETERS: HCPCS | Performed by: FAMILY MEDICINE

## 2021-12-06 PROCEDURE — 1036F TOBACCO NON-USER: CPT | Performed by: FAMILY MEDICINE

## 2021-12-06 PROCEDURE — 90688 IIV4 VACCINE SPLT 0.5 ML IM: CPT | Performed by: FAMILY MEDICINE

## 2021-12-06 PROCEDURE — G8482 FLU IMMUNIZE ORDER/ADMIN: HCPCS | Performed by: FAMILY MEDICINE

## 2021-12-06 PROCEDURE — 90471 IMMUNIZATION ADMIN: CPT | Performed by: FAMILY MEDICINE

## 2021-12-06 PROCEDURE — 99214 OFFICE O/P EST MOD 30 MIN: CPT | Performed by: FAMILY MEDICINE

## 2021-12-06 RX ORDER — HYDROXYZINE PAMOATE 25 MG/1
25 CAPSULE ORAL 3 TIMES DAILY PRN
Qty: 45 CAPSULE | Refills: 0 | Status: SHIPPED | OUTPATIENT
Start: 2021-12-06 | End: 2021-12-20

## 2021-12-06 RX ORDER — ESCITALOPRAM OXALATE 5 MG/1
5 TABLET ORAL DAILY
Qty: 90 TABLET | Refills: 3 | Status: SHIPPED | OUTPATIENT
Start: 2021-12-06 | End: 2022-07-07

## 2021-12-06 ASSESSMENT — ENCOUNTER SYMPTOMS: SHORTNESS OF BREATH: 0

## 2021-12-06 NOTE — PATIENT INSTRUCTIONS
Patient Education         Relaxation Exercise: Deep Breathing (01:49)  Your health professional recommends that you watch this short online health video. Learn a deep-breathing exercise to reduce your stress. Purpose:  Leads patients through a deep-breathing exercise to reduce stress. Goal:  The user will learn how to do a deep-breathing exercise to reduce stress. How to watch the video    Scan the QR code   OR Visit the website    [email or web address]   Current as of: June 16, 2021               Content Version: 13.0  © 2006-2021 [employer]. Care instructions adapted under license by [facility] ([facility]). If you have questions about a medical condition or this instruction, always ask your healthcare professional. [address] any warranty or liability for your use of this information. Patient Education        Learning About Progressive Muscle Relaxation for Stress  What are progressive muscle relaxation and stress? Stress is what you feel when you have to handle more than you are used to. A lot of things can cause stress. You may feel stress when you go on a job interview, take a test, or run a race. This kind of short-term stress is normal and even useful. It can help you if you need to work hard or react quickly. Stress also can last a long time. Long-term stress is caused by stressful situations or events. Examples of long-term stress include long-term health problems, ongoing problems at work, and conflicts in your family. Long-term stress can harm your health. When you have anxiety or stress in your life, one of the ways your body responds is with muscle tension. Progressive muscle relaxation is a method that helps relieve that tension. How does progressive muscle relaxation reduce stress? The body responds to stress with muscle tension, which can cause pain or discomfort. In turn, tense muscles relay to the body that it's stressed.  That keeps the cycle of stress and muscle tension going. Progressive muscle relaxation helps break this cycle by reducing muscle tension and general mental anxiety. This method often helps people get to sleep. How do you do progressive muscle relaxation? To do progressive muscle relaxation, first you tense a group of muscles as you breathe in. Then you relax them as you breathe out. Notice how your muscles feel when you relax them. You work on your muscle groups in a certain order. Through practice, you can learn to feel the difference between a tensed muscle and a relaxed muscle. Then you can learn how to turn on this relaxed state at the first sign of a muscle tensing up due to stress. Practicing this method for a few weeks will help you get better at this skill. In time you'll be able to use this method to relieve stress. When you first start, it may help to use an audio recording until you learn all the muscle groups in order. Check online for these recordings. Choose a place where you won't be interrupted. It should have space for you to lie down on your back and stretch out comfortably, such as on a carpeted floor. Follow-up care is a key part of your treatment and safety. Be sure to make and go to all appointments, and call your doctor if you are having problems. It's also a good idea to know your test results and keep a list of the medicines you take. Where can you learn more? Go to [email or web address]://[email or web address]. org and sign in to your [email or web address] account. Enter L987 in the [facility] box to learn more about \"Learning About Progressive Muscle Relaxation for Stress. \"     If you do not have an account, please click on the \"Sign Up Now\" link. Current as of: June 16, 2021               Content Version: 13.0  © 7454-4050 [employer]. Care instructions adapted under license by [facility] ([facility]).  If you have questions about a medical condition or this instruction, always ask your healthcare professional. [address] any warranty or liability for your use of this information.

## 2021-12-06 NOTE — PROGRESS NOTES
Chief Complaint   Patient presents with    Anxiety     3 month check up       HPI:  Kayla Mitchell is a 27 y.o. (: 1995) here today   for 3 month check up on anxiety. HPI  Had been having issues w/ intercourse. Improved. Has completed PT. Has been doing well w/ lexapro. jacqueline well. occas vistaril. Some weeks, takes none. Other weeks, 1-2 times per week. Causes drowsiness. Did not jacqueline zoloft or effexor in the past.      Has been taking aldactone for acne. Sees derm. Has on lipitor for approx 3 mo.  jacqueline well. Due for rpt labs. Denies risk factors for hep c or hiv. Seeing gyn for wwe. No recent IBS issues. Overall doing well. Patient's medications, allergies, past medical, surgical, social and family histories were reviewed and updated as appropriate. ROS:  Review of Systems   Constitutional: Negative for fever. Respiratory: Negative for shortness of breath. Psychiatric/Behavioral: The patient is nervous/anxious (improved).             Microscopic Examination (no units)   Date Value   2018 YES     LDL Calculated (mg/dL)   Date Value   2021 157 (H)       Past Medical History:   Diagnosis Date    Acne     Anxiety     Benign mole     on right nasal bridge, Dr. Anette Muller, [facility]    Hyperlipemia     Hyperlipemia     Kidney stones 2018       Family History   Problem Relation Age of Onset    High Cholesterol Father     Heart Disease Maternal Grandmother         valve replaced    COPD Maternal Grandmother     Coronary Art Dis Maternal Grandmother     Heart Disease Maternal Grandfather     Stroke Paternal Grandfather     Other Maternal Uncle     Other Paternal Aunt     Cancer Paternal Uncle 47        pancreatic cancer       Social History     Socioeconomic History    Marital status:      Spouse name: Not on file    Number of children: Not on file    Years of education: Not on file    Highest education level: Not on file   Occupational History  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   Tobacco Use    Smoking status: Never Smoker    Smokeless tobacco: Never Used   Vaping Use    Vaping Use: Never used   Substance and Sexual Activity    Alcohol use: No    Drug use: No    Sexual activity: Yes     Birth control/protection: Pill   Other Topics Concern    Not on file   Social History Narrative    Not on file     Social Determinants of Health     Financial Resource Strain: Low Risk     Difficulty of Paying Living Expenses: Not hard at all   Food Insecurity: No Food Insecurity    Worried About [address] in the Last Year: Never true    [facility] in the Last Year: Never true   Transportation Needs: Unknown    Lack of Transportation (Medical): No    Lack of Transportation (Non-Medical): Not asked   Physical Activity:     Days of Exercise per Week: Not on file    Minutes of Exercise per Session: Not on file   Stress:     Feeling of Stress : Not on file   Social Connections:     Frequency of Communication with Friends and Family: Not on file    Frequency of Social Gatherings with Friends and Family: Not on file    Attends Taoism Services: Not on file    Active Member of [address] [place] [address] or Organizations: Not on file    Attends Club or Organization Meetings: Not on file    Marital Status: Not on file   Intimate Partner Violence:     Fear of Current or Ex-Partner: Not on file    Emotionally Abused: Not on file    Physically Abused: Not on file    Sexually Abused: Not on file   Housing Stability:     Unable to Pay for Housing in the Last Year: Not on file    Number of [place] in the Last Year: Not on file    Unstable Housing in the Last Year: Not on file       Prior to Visit Medications    Medication Sig Taking?  Authorizing Provider   escitalopram (LEXAPRO) 5 MG tablet Take 1 tablet by mouth daily Yes Catalina Rios MD   hydrOXYzine (VISTARIL) 25 MG capsule Take 1 capsule by mouth 3 times daily as needed for Anxiety Yes Janay Mcnamara Jaycob Bush MD   levonorgestrel-ethinyl estradiol (SEASONALE) 0.15-0.03 MG per tablet TAKE 1 TABLET BY MOUTH  DAILY Yes Damaris Ronquillo DO   atorvastatin (LIPITOR) 10 MG tablet Take 1 tablet by mouth daily Yes Twyla Smith APRN - CNP   spironolactone (ALDACTONE) 25 MG tablet Take 25 mg by mouth 2 times daily Yes Historical Provider, MD   estradiol (ESTRACE VAGINAL) 0.1 MG/GM vaginal cream Place 1 g vaginally Twice a Week Yes Damaris Ronquillo DO   dibucaine 1 % perianal ointment Place around the anus 4 times daily as needed for Pain Yes Damaris Ronquillo DO   glycopyrrolate (ROBINUL) 1 MG tablet Take 1 mg by mouth 2 times daily  Yes Historical Provider, MD       No Known Allergies    OBJECTIVE:    /76   Pulse 102   Ht 5' 5\" (1.651 m)   Wt 137 lb 12.8 oz (62.5 kg)   SpO2 99%   BMI 22.93 kg/m²     BP Readings from Last 2 Encounters:   12/06/21 104/76   09/07/21 102/68       Wt Readings from Last 3 Encounters:   12/06/21 137 lb 12.8 oz (62.5 kg)   09/07/21 133 lb (60.3 kg)   01/29/21 136 lb (61.7 kg)       Physical Exam  Constitutional:       Appearance: Normal appearance. HENT:      Head: Normocephalic and atraumatic. Eyes:      Extraocular Movements: Extraocular movements intact. Cardiovascular:      Rate and Rhythm: Normal rate and regular rhythm. Pulmonary:      Effort: Pulmonary effort is normal.      Breath sounds: Normal breath sounds. Skin:     General: Skin is warm and dry. Neurological:      General: No focal deficit present. Mental Status: She is alert and oriented to person, place, and time. Psychiatric:         Mood and Affect: Mood normal.         Behavior: Behavior normal.           ASSESSMENT/PLAN:    1. Anxiety  The current medical regimen is effective;  continue present plan and medications. Rarely using vistaril. Refills. - escitalopram (LEXAPRO) 5 MG tablet; Take 1 tablet by mouth daily  Dispense: 90 tablet;  Refill: 3  - hydrOXYzine (VISTARIL) 25 MG capsule; Take 1 capsule by mouth 3 times daily as needed for Anxiety  Dispense: 45 capsule; Refill: 0    2. Other hyperlipidemia  jacquleine statin. Rpt labs at [facility] at her convenience. - Lipid Panel; Future  - Comprehensive Metabolic Panel; Future    3. Irritable bowel syndrome with diarrhea  improved    4.  Need for immunization against influenza    - INFLUENZA, QUADV, 3 YRS AND OLDER, IM, MDV, 0.5ML (Kasey Yeung)

## 2022-02-21 ENCOUNTER — NURSE ONLY (OUTPATIENT)
Dept: FAMILY MEDICINE CLINIC | Age: 27
End: 2022-02-21
Payer: COMMERCIAL

## 2022-02-21 DIAGNOSIS — E78.49 OTHER HYPERLIPIDEMIA: ICD-10-CM

## 2022-02-21 PROCEDURE — 36415 COLL VENOUS BLD VENIPUNCTURE: CPT | Performed by: FAMILY MEDICINE

## 2022-02-21 NOTE — PROGRESS NOTES
Blood drawn per order. Needle size: 23 g  Site: R Antecubital.  First attempt successful Yes    Second attempt no    Pressure applied until bleeding stopped. Pressure applied. Patient informed to call office or return if bleeding reoccurs and unable to stop.     Tubes drawn: 0 purple     1 red

## 2022-02-22 LAB
A/G RATIO: 1.7 (ref 1.1–2.2)
ALBUMIN SERPL-MCNC: 4.5 G/DL (ref 3.4–5)
ALP BLD-CCNC: 71 U/L (ref 40–129)
ALT SERPL-CCNC: 9 U/L (ref 10–40)
ANION GAP SERPL CALCULATED.3IONS-SCNC: 18 MMOL/L (ref 3–16)
AST SERPL-CCNC: 13 U/L (ref 15–37)
BILIRUB SERPL-MCNC: 0.4 MG/DL (ref 0–1)
BUN BLDV-MCNC: 6 MG/DL (ref 7–20)
CALCIUM SERPL-MCNC: 9.2 MG/DL (ref 8.3–10.6)
CHLORIDE BLD-SCNC: 103 MMOL/L (ref 99–110)
CHOLESTEROL, TOTAL: 244 MG/DL (ref 0–199)
CO2: 20 MMOL/L (ref 21–32)
CREAT SERPL-MCNC: 0.6 MG/DL (ref 0.6–1.1)
GFR AFRICAN AMERICAN: >60
GFR NON-AFRICAN AMERICAN: >60
GLUCOSE BLD-MCNC: 84 MG/DL (ref 70–99)
HDLC SERPL-MCNC: 71 MG/DL (ref 40–60)
LDL CHOLESTEROL CALCULATED: 157 MG/DL
POTASSIUM SERPL-SCNC: 3.6 MMOL/L (ref 3.5–5.1)
SODIUM BLD-SCNC: 141 MMOL/L (ref 136–145)
TOTAL PROTEIN: 7.2 G/DL (ref 6.4–8.2)
TRIGL SERPL-MCNC: 79 MG/DL (ref 0–150)
VLDLC SERPL CALC-MCNC: 16 MG/DL

## 2022-07-07 ENCOUNTER — OFFICE VISIT (OUTPATIENT)
Dept: FAMILY MEDICINE CLINIC | Age: 27
End: 2022-07-07
Payer: COMMERCIAL

## 2022-07-07 VITALS
HEART RATE: 80 BPM | BODY MASS INDEX: 22.46 KG/M2 | HEIGHT: 65 IN | SYSTOLIC BLOOD PRESSURE: 136 MMHG | WEIGHT: 134.8 LBS | DIASTOLIC BLOOD PRESSURE: 84 MMHG | OXYGEN SATURATION: 99 %

## 2022-07-07 DIAGNOSIS — F41.9 ANXIETY: ICD-10-CM

## 2022-07-07 DIAGNOSIS — E78.49 OTHER HYPERLIPIDEMIA: Primary | ICD-10-CM

## 2022-07-07 PROCEDURE — 99213 OFFICE O/P EST LOW 20 MIN: CPT | Performed by: FAMILY MEDICINE

## 2022-07-07 ASSESSMENT — PATIENT HEALTH QUESTIONNAIRE - PHQ9
10. IF YOU CHECKED OFF ANY PROBLEMS, HOW DIFFICULT HAVE THESE PROBLEMS MADE IT FOR YOU TO DO YOUR WORK, TAKE CARE OF THINGS AT HOME, OR GET ALONG WITH OTHER PEOPLE: 0
6. FEELING BAD ABOUT YOURSELF - OR THAT YOU ARE A FAILURE OR HAVE LET YOURSELF OR YOUR FAMILY DOWN: 0
7. TROUBLE CONCENTRATING ON THINGS, SUCH AS READING THE NEWSPAPER OR WATCHING TELEVISION: 0
SUM OF ALL RESPONSES TO PHQ9 QUESTIONS 1 & 2: 0
SUM OF ALL RESPONSES TO PHQ QUESTIONS 1-9: 0
1. LITTLE INTEREST OR PLEASURE IN DOING THINGS: 0
3. TROUBLE FALLING OR STAYING ASLEEP: 0
9. THOUGHTS THAT YOU WOULD BE BETTER OFF DEAD, OR OF HURTING YOURSELF: 0
2. FEELING DOWN, DEPRESSED OR HOPELESS: 0
4. FEELING TIRED OR HAVING LITTLE ENERGY: 0
SUM OF ALL RESPONSES TO PHQ QUESTIONS 1-9: 0
8. MOVING OR SPEAKING SO SLOWLY THAT OTHER PEOPLE COULD HAVE NOTICED. OR THE OPPOSITE, BEING SO FIGETY OR RESTLESS THAT YOU HAVE BEEN MOVING AROUND A LOT MORE THAN USUAL: 0
SUM OF ALL RESPONSES TO PHQ QUESTIONS 1-9: 0
SUM OF ALL RESPONSES TO PHQ QUESTIONS 1-9: 0
5. POOR APPETITE OR OVEREATING: 0

## 2022-07-07 ASSESSMENT — ENCOUNTER SYMPTOMS
DIARRHEA: 0
CONSTIPATION: 0
NAUSEA: 0
SHORTNESS OF BREATH: 0

## 2022-07-07 NOTE — PROGRESS NOTES
Chief Complaint   Patient presents with    Hyperlipidemia       HPI:  Darrel Nash is a 32 y.o. (: 1995) here today   for   Hyperlipidemia  This is a chronic problem. The current episode started more than 1 year ago. Pertinent negatives include no shortness of breath. Current antihyperlipidemic treatment includes statins. The current treatment provides moderate improvement of lipids. There are no compliance problems. Risk factors for coronary artery disease include dyslipidemia. Stopped birth control and lexapro several mo ago. Overall doing well w/ mood/anxiety. Periodically using vistaril. Prior issues w/ pain w/ intercourse improved. Patient's medications, allergies, past medical, surgical, social and family histories were reviewed and updated as appropriate. ROS:  Review of Systems   Constitutional: Negative for fever. Respiratory: Negative for shortness of breath. Gastrointestinal: Negative for constipation, diarrhea and nausea. Psychiatric/Behavioral: The patient is nervous/anxious.             Microscopic Examination (no units)   Date Value   2018 YES     LDL Calculated (mg/dL)   Date Value   2022 157 (H)       Past Medical History:   Diagnosis Date    Acne     Anxiety     Benign mole     on right nasal bridge, [facility] Mick [facility]ordano, [facility]    Hyperlipemia     Hyperlipemia     Kidney stones 2018       Family History   Problem Relation Age of Onset    High Cholesterol Father     Heart Disease Maternal Grandmother         valve replaced    COPD Maternal Grandmother     Coronary Art Dis Maternal Grandmother     Heart Disease Maternal Grandfather     Stroke Paternal Grandfather     Other Maternal Uncle     Other Paternal Aunt     Cancer Paternal Uncle 47        pancreatic cancer       Social History     Socioeconomic History    Marital status:      Spouse name: Not on file    Number of children: Not on file    Years of education: Not on file   Heriberto Mcconnell Highest education level: Not on file   Occupational History    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   Tobacco Use    Smoking status: Never Smoker    Smokeless tobacco: Never Used   Vaping Use    Vaping Use: Never used   Substance and Sexual Activity    Alcohol use: No    Drug use: No    Sexual activity: Yes     Birth control/protection: Pill   Other Topics Concern    Not on file   Social History Narrative    Not on file     Social Determinants of Health     Financial Resource Strain:     Difficulty of Paying Living Expenses: Not on file   Food Insecurity:     Worried About Running Out of Food in the Last Year: Not on file    Casey of Food in the Last Year: Not on file   Transportation Needs:     Lack of Transportation (Medical): Not on file    Lack of Transportation (Non-Medical): Not on file   Physical Activity:     Days of Exercise per Week: Not on file    Minutes of Exercise per Session: Not on file   Stress:     Feeling of Stress : Not on file   Social Connections:     Frequency of Communication with Friends and Family: Not on file    Frequency of Social Gatherings with Friends and Family: Not on file    Attends [employer] Services: Not on file    Active Member of [address] [place] [address] or Organizations: Not on file    Attends Club or Organization Meetings: Not on file    Marital Status: Not on file   Intimate Partner Violence:     Fear of Current or Ex-Partner: Not on file    Emotionally Abused: Not on file    Physically Abused: Not on file    Sexually Abused: Not on file   Housing Stability:     Unable to Pay for Housing in the Last Year: Not on file    Number of [place] in the Last Year: Not on file    Unstable Housing in the Last Year: Not on file       Prior to Visit Medications    Medication Sig Taking?  Authorizing Provider   atorvastatin (LIPITOR) 10 MG tablet Take 1 tablet by mouth daily Yes NAVARRO Main CNP   estradiol (ESTRACE VAGINAL) 0.1 MG/GM vaginal cream Place 1 g vaginally Twice a Week Yes Anicetonasra Ronquillo, DO   dibucaine 1 % perianal ointment Place around the anus 4 times daily as needed for Pain  Lamar Aman Ronquillo, DO       No Known Allergies    OBJECTIVE:    /84   Pulse (!) 130   Ht 5' 5\" (1.651 m)   Wt 134 lb 12.8 oz (61.1 kg)   SpO2 99%   BMI 22.43 kg/m²     BP Readings from Last 2 Encounters:   07/07/22 136/84   12/06/21 104/76       Wt Readings from Last 3 Encounters:   07/07/22 134 lb 12.8 oz (61.1 kg)   12/06/21 137 lb 12.8 oz (62.5 kg)   09/07/21 133 lb (60.3 kg)       Physical Exam  Constitutional:       Appearance: Normal appearance. HENT:      Head: Normocephalic and atraumatic. Eyes:      Extraocular Movements: Extraocular movements intact. Cardiovascular:      Rate and Rhythm: Normal rate and regular rhythm. Pulmonary:      Effort: Pulmonary effort is normal.      Breath sounds: Normal breath sounds. Skin:     General: Skin is warm and dry. Neurological:      General: No focal deficit present. Mental Status: She is alert and oriented to person, place, and time. Psychiatric:         Mood and Affect: Mood normal.         Behavior: Behavior normal.           ASSESSMENT/PLAN:    1. Other hyperlipidemia  Sig fam hx of elevated chol. No other sig cv risk factors. Considering getting pregnant. Ok to stop statin during any pregnancy/ breast feeding. Risk to baby likely outweighs her very minimal inc risk of stopping med in the interim. Plan on labs prior to stopping med. - Lipid Panel; Future  - Comprehensive Metabolic Panel; Future    2. Anxiety  Overall improved. Off lexapro.   Cont prn vistaril

## 2023-02-20 DIAGNOSIS — F41.9 ANXIETY: ICD-10-CM

## 2023-02-20 RX ORDER — HYDROXYZINE PAMOATE 25 MG/1
25 CAPSULE ORAL 3 TIMES DAILY PRN
Qty: 45 CAPSULE | Refills: 0 | Status: SHIPPED | OUTPATIENT
Start: 2023-02-20 | End: 2023-03-06

## 2024-01-22 ENCOUNTER — OFFICE VISIT (OUTPATIENT)
Dept: FAMILY MEDICINE CLINIC | Age: 29
End: 2024-01-22
Payer: COMMERCIAL

## 2024-01-22 VITALS
HEIGHT: 65 IN | HEART RATE: 90 BPM | BODY MASS INDEX: 24.96 KG/M2 | SYSTOLIC BLOOD PRESSURE: 102 MMHG | OXYGEN SATURATION: 99 % | WEIGHT: 149.8 LBS | DIASTOLIC BLOOD PRESSURE: 72 MMHG

## 2024-01-22 DIAGNOSIS — R53.83 OTHER FATIGUE: ICD-10-CM

## 2024-01-22 DIAGNOSIS — B37.31 VAGINAL YEAST INFECTION: ICD-10-CM

## 2024-01-22 DIAGNOSIS — K58.0 IRRITABLE BOWEL SYNDROME WITH DIARRHEA: ICD-10-CM

## 2024-01-22 DIAGNOSIS — E78.2 MIXED HYPERLIPIDEMIA: Primary | ICD-10-CM

## 2024-01-22 DIAGNOSIS — F41.9 ANXIETY: ICD-10-CM

## 2024-01-22 LAB
ALBUMIN SERPL-MCNC: 4.4 G/DL (ref 3.4–5)
ALBUMIN/GLOB SERPL: 1.8 {RATIO} (ref 1.1–2.2)
ALP SERPL-CCNC: 101 U/L (ref 40–129)
ALT SERPL-CCNC: 15 U/L (ref 10–40)
ANION GAP SERPL CALCULATED.3IONS-SCNC: 10 MMOL/L (ref 3–16)
AST SERPL-CCNC: 16 U/L (ref 15–37)
BASOPHILS # BLD: 0 K/UL (ref 0–0.2)
BASOPHILS NFR BLD: 0.6 %
BILIRUB SERPL-MCNC: <0.2 MG/DL (ref 0–1)
BUN SERPL-MCNC: 10 MG/DL (ref 7–20)
CALCIUM SERPL-MCNC: 8.6 MG/DL (ref 8.3–10.6)
CHLORIDE SERPL-SCNC: 101 MMOL/L (ref 99–110)
CHOLEST SERPL-MCNC: 211 MG/DL (ref 0–199)
CO2 SERPL-SCNC: 26 MMOL/L (ref 21–32)
CREAT SERPL-MCNC: 0.5 MG/DL (ref 0.6–1.1)
DEPRECATED RDW RBC AUTO: 13.3 % (ref 12.4–15.4)
EOSINOPHIL # BLD: 0.1 K/UL (ref 0–0.6)
EOSINOPHIL NFR BLD: 3 %
GFR SERPLBLD CREATININE-BSD FMLA CKD-EPI: >60 ML/MIN/{1.73_M2}
GLUCOSE SERPL-MCNC: 93 MG/DL (ref 70–99)
HCT VFR BLD AUTO: 37.1 % (ref 36–48)
HDLC SERPL-MCNC: 68 MG/DL (ref 40–60)
HGB BLD-MCNC: 12.4 G/DL (ref 12–16)
LDLC SERPL CALC-MCNC: 133 MG/DL
LYMPHOCYTES # BLD: 1 K/UL (ref 1–5.1)
LYMPHOCYTES NFR BLD: 21.2 %
MCH RBC QN AUTO: 30 PG (ref 26–34)
MCHC RBC AUTO-ENTMCNC: 33.5 G/DL (ref 31–36)
MCV RBC AUTO: 89.5 FL (ref 80–100)
MONOCYTES # BLD: 0.5 K/UL (ref 0–1.3)
MONOCYTES NFR BLD: 10.3 %
NEUTROPHILS # BLD: 3.1 K/UL (ref 1.7–7.7)
NEUTROPHILS NFR BLD: 64.9 %
PLATELET # BLD AUTO: 315 K/UL (ref 135–450)
PMV BLD AUTO: 9.3 FL (ref 5–10.5)
POTASSIUM SERPL-SCNC: 3.7 MMOL/L (ref 3.5–5.1)
PROT SERPL-MCNC: 6.8 G/DL (ref 6.4–8.2)
RBC # BLD AUTO: 4.15 M/UL (ref 4–5.2)
SODIUM SERPL-SCNC: 137 MMOL/L (ref 136–145)
T4 FREE SERPL-MCNC: 1.1 NG/DL (ref 0.9–1.8)
TRIGL SERPL-MCNC: 52 MG/DL (ref 0–150)
TSH SERPL DL<=0.005 MIU/L-ACNC: 1.48 UIU/ML (ref 0.27–4.2)
VIT B12 SERPL-MCNC: 347 PG/ML (ref 211–911)
VLDLC SERPL CALC-MCNC: 10 MG/DL
WBC # BLD AUTO: 4.8 K/UL (ref 4–11)

## 2024-01-22 PROCEDURE — 99214 OFFICE O/P EST MOD 30 MIN: CPT | Performed by: FAMILY MEDICINE

## 2024-01-22 RX ORDER — HYDROXYZINE HYDROCHLORIDE 10 MG/1
25 TABLET, FILM COATED ORAL 3 TIMES DAILY PRN
COMMUNITY

## 2024-01-22 RX ORDER — FLUCONAZOLE 150 MG/1
150 TABLET ORAL
Qty: 2 TABLET | Refills: 0 | Status: SHIPPED | OUTPATIENT
Start: 2024-01-22 | End: 2024-01-28

## 2024-01-22 RX ORDER — BUSPIRONE HYDROCHLORIDE 5 MG/1
5 TABLET ORAL 2 TIMES DAILY
Qty: 60 TABLET | Refills: 2 | Status: SHIPPED | OUTPATIENT
Start: 2024-01-22 | End: 2024-04-21

## 2024-01-22 ASSESSMENT — PATIENT HEALTH QUESTIONNAIRE - PHQ9
SUM OF ALL RESPONSES TO PHQ QUESTIONS 1-9: 11
SUM OF ALL RESPONSES TO PHQ QUESTIONS 1-9: 11
6. FEELING BAD ABOUT YOURSELF - OR THAT YOU ARE A FAILURE OR HAVE LET YOURSELF OR YOUR FAMILY DOWN: 0
2. FEELING DOWN, DEPRESSED OR HOPELESS: 2
8. MOVING OR SPEAKING SO SLOWLY THAT OTHER PEOPLE COULD HAVE NOTICED. OR THE OPPOSITE, BEING SO FIGETY OR RESTLESS THAT YOU HAVE BEEN MOVING AROUND A LOT MORE THAN USUAL: 0
9. THOUGHTS THAT YOU WOULD BE BETTER OFF DEAD, OR OF HURTING YOURSELF: 0
3. TROUBLE FALLING OR STAYING ASLEEP: 2
SUM OF ALL RESPONSES TO PHQ QUESTIONS 1-9: 11
SUM OF ALL RESPONSES TO PHQ9 QUESTIONS 1 & 2: 4
5. POOR APPETITE OR OVEREATING: 0
10. IF YOU CHECKED OFF ANY PROBLEMS, HOW DIFFICULT HAVE THESE PROBLEMS MADE IT FOR YOU TO DO YOUR WORK, TAKE CARE OF THINGS AT HOME, OR GET ALONG WITH OTHER PEOPLE: 2
7. TROUBLE CONCENTRATING ON THINGS, SUCH AS READING THE NEWSPAPER OR WATCHING TELEVISION: 2
1. LITTLE INTEREST OR PLEASURE IN DOING THINGS: 2
SUM OF ALL RESPONSES TO PHQ QUESTIONS 1-9: 11
4. FEELING TIRED OR HAVING LITTLE ENERGY: 3

## 2024-01-22 ASSESSMENT — ENCOUNTER SYMPTOMS
DIARRHEA: 1
SHORTNESS OF BREATH: 0

## 2024-01-22 NOTE — PROGRESS NOTES
Chief Complaint   Patient presents with    Annual Exam       HPI:  Shauna Todd is a 29 y.o. (: 1995) here today   for   HPI  Anxiety has been worse recently.  Had been taking hydroxyzine prn.  Seems to fluctuate.  In the past, had been more situational anxiety.  Other times, no particular exacerbating factors.  Has had some life changes, stessors w/ job, others.  Had been on lexapro in the past.  Feels anxiety is making her depressed.  Overwhelmed.  Had been on lexapro in the past.  Had SE night terrors, sweats.  Stopped in the past, those SE resolved w/ stopping med. Sig drowsiness w/ hydroxyzine.  Had been seeing therapist/ counselor.  Did not seem to help much.     Prior chol elevated. Has not had checked recently.  Had been on atorvastatin in the past.  Stopped some time ago.  Was attempting pregnancy in the past.      Some issues re vaginal yeast infxn.  Tried otc meds w/o relief.  Similar to prior yeast issue.      Patient's medications, allergies, past medical, surgical, social and family histories were reviewed and updated as appropriate.    ROS:  Review of Systems   Constitutional:  Positive for fatigue. Negative for fever.   Respiratory:  Negative for shortness of breath.    Gastrointestinal:  Positive for diarrhea.   Genitourinary:  Positive for vaginal discharge.   Psychiatric/Behavioral:  Positive for dysphoric mood. The patient is nervous/anxious.            LDL Calculated (mg/dL)   Date Value   2022 157 (H)       Past Medical History:   Diagnosis Date    Acne     Anxiety     Benign mole     on right nasal bridge, Dr. Orozco, [facility]    Hyperlipemia     Hyperlipemia     Kidney stones 2018       Family History   Problem Relation Age of Onset    High Cholesterol Father     Heart Disease Maternal Grandmother         valve replaced    COPD Maternal Grandmother     Coronary Art Dis Maternal Grandmother     Heart Disease Maternal Grandfather     Stroke Paternal Grandfather     Other

## 2024-01-23 NOTE — RESULT ENCOUNTER NOTE
Chol overall improved from last check.  I don't believe she has been taking chol med.  Ok to monitor for now. Cmp normal.  Thyroid labs ok.  B12 borderline low.  May benefit from otc b12 supplement. Cbc normal.  Meds added yesterday for anxiety.  Call or f/u w/ response

## 2024-01-30 ENCOUNTER — TELEMEDICINE (OUTPATIENT)
Dept: PSYCHOLOGY | Age: 29
End: 2024-01-30
Payer: COMMERCIAL

## 2024-01-30 DIAGNOSIS — F41.1 GAD (GENERALIZED ANXIETY DISORDER): ICD-10-CM

## 2024-01-30 DIAGNOSIS — F33.1 MDD (MAJOR DEPRESSIVE DISORDER), RECURRENT EPISODE, MODERATE (HCC): Primary | ICD-10-CM

## 2024-01-30 PROCEDURE — 90791 PSYCH DIAGNOSTIC EVALUATION: CPT | Performed by: SOCIAL WORKER

## 2024-01-30 NOTE — PROGRESS NOTES
Behavioral Health Consultation  STEPHY Arauz  1/30/2024  2:14 PM EST      Time spent with Patient: 45 minutes  This is patient's first [facility] appointment.    Reason for Consult:    Chief Complaint   Patient presents with    Anxiety    Depression     Referring Provider: Baldev Velazquez MD  [address] [facility] [address] [place] [id number]    Pt provided informed consent for the behavioral health program. Discussed with patient model of service to include the limits of confidentiality (i.e. abuse reporting, suicide intervention, etc.) and short-term intervention focused approach. Pt indicated understanding.  Feedback given to PCP.    TELEHEALTH VISIT -- Audio/Visual   }  Services were provided through a video synchronous discussion virtually to substitute for in-person clinic visit. Pt gave verbal informed consent to participate in telehealth services.     Conducted a risk-benefit analysis and determined that the patient's presenting problems are consistent with the use of telepsychology. Determined that the patient has sufficient knowledge and skills in the use of technology enabling them to adequately benefit from telepsychology. It was determined that this patient was able to be properly treated without an in-person session. Patient verified that they were currently located at the [place] address that was provided during registration.    Verified the following information:  Patient's identification: Yes  Patient location: [address] [place] [address] [place] [address]  Patient's call back number: [phone]   Patient's emergency contact's name and number, as well as permission to contact them if needed: Extended Emergency Contact Information  Primary Emergency Contact: Blanca Harry  Address: [address] [place] [id number] [facility]  Home Phone: [phone]  Relation: Parent  Secondary Emergency Contact: Jaron Todd  Address: [address] [place], [place] [address]athon Kandis

## 2024-01-30 NOTE — PATIENT INSTRUCTIONS
Get the book \"come as you are\" by sera garcia  Don't turn on light if using bathroom  Box breathing 4 x 4 x 4   30 sec hold your body  5 thing you are grateful for why right before bed  Sera Betty   Youtube: \"the power of vulnerability\" and \"listening to shame\"-dania talk           Panic Disorder Handout    What Is a Panic Attack?  Sometimes we experience a sudden and severe onset of symptoms that can be scary. These symptoms can include some or all of the following:     Pounding heart or increased heart rate   Feeling dizzy, unsteady, lightheaded, or faint   Sweating   Nausea   Feelings of unreality or being detached from yourself   Trembling or shaking   Fear of losing control or going crazy   Shortness of breath   Fear of dying   Feeling of choking   Numbness or tingling   Chest pain   Chills or hot flashes    Although we don’t fully understand why some people experience panic attacks and other people don’t, we do know that these symptoms are related to a very normal response called the fight-flight response. This response allows our body to react quickly when we think that something is dangerous, such as being attacked or being cut off when we are driving.    How Panic Attacks Affect Our Lives    Because symptoms of a panic attack occur out of the blue, we can become worried about them, and we may begin to avoid situations that we think will result in these panic symptoms, such as crowded stores, public transportation, or driving. What situations have you avoided because of panic attacks?                                                                                                                                                                                                                              Changing Thinking Patterns    One of the most important changes associated with decreasing panic attacks is changing how we think. The fear associated with having a panic attack may increase the likelihood

## 2024-02-13 ENCOUNTER — TELEMEDICINE (OUTPATIENT)
Dept: PSYCHOLOGY | Age: 29
End: 2024-02-13
Payer: COMMERCIAL

## 2024-02-13 DIAGNOSIS — F41.1 GAD (GENERALIZED ANXIETY DISORDER): ICD-10-CM

## 2024-02-13 DIAGNOSIS — F33.1 MDD (MAJOR DEPRESSIVE DISORDER), RECURRENT EPISODE, MODERATE (HCC): Primary | ICD-10-CM

## 2024-02-13 PROCEDURE — 90834 PSYTX W PT 45 MINUTES: CPT | Performed by: SOCIAL WORKER

## 2024-02-13 NOTE — PROGRESS NOTES
Behavioral Health Consultation  STEPHY Arauz  2/13/2024  3:36 PM EST      Time spent with Patient: 45 minutes  This is patient's second [facility] appointment.    Reason for Consult:    Chief Complaint   Patient presents with    Anxiety    Depression     Referring Provider: Baldev Velazquez MD  [address] [address] [address]    Feedback given to PCP.    TELEHEALTH VISIT -- Audio/Visual   }  Services were provided through a video synchronous discussion virtually to substitute for in-person clinic visit. Pt gave verbal informed consent to participate in telehealth services.     Conducted a risk-benefit analysis and determined that the patient's presenting problems are consistent with the use of telepsychology. Determined that the patient has sufficient knowledge and skills in the use of technology enabling them to adequately benefit from telepsychology. It was determined that this patient was able to be properly treated without an in-person session. Patient verified that they were currently located at the [place] address that was provided during registration.    Verified the following information:  Patient's identification: Yes  Patient location: [address] [place] [address]   Patient's call back number: [phone]   Patient's emergency contact's name and number, as well as permission to contact them if needed: Extended Emergency Contact Information  Primary Emergency Contact: Blanca Harry  Address: [address] [place] [address] [facility]  Home Phone: [phone]  Relation: Parent  Secondary Emergency Contact: Jaron Todd  Address: [address]  Home Phone: [phone]  Mobile Phone: [phone]  Relation: Spouse     Provider location: [place], [place]     S:  Pt feels that things will ebb and flow, and will have things be okay and then ball will drop and it will get really

## 2024-03-05 ENCOUNTER — TELEMEDICINE (OUTPATIENT)
Dept: PSYCHOLOGY | Age: 29
End: 2024-03-05
Payer: COMMERCIAL

## 2024-03-05 DIAGNOSIS — F41.1 GAD (GENERALIZED ANXIETY DISORDER): ICD-10-CM

## 2024-03-05 DIAGNOSIS — F33.1 MDD (MAJOR DEPRESSIVE DISORDER), RECURRENT EPISODE, MODERATE (HCC): Primary | ICD-10-CM

## 2024-03-05 PROCEDURE — 90832 PSYTX W PT 30 MINUTES: CPT | Performed by: SOCIAL WORKER

## 2024-03-05 NOTE — PROGRESS NOTES
Behavioral Health Consultation  STEPHY Arauz  3/5/2024  3:29 PM EST      Time spent with Patient: 30 minutes  This is patient's third [facility] appointment.    Reason for Consult:    Chief Complaint   Patient presents with    Anxiety    Depression     Referring Provider: Baldev Velazquez MD  [address] [facility] [address] [place] [address]    Feedback given to PCP.    TELEHEALTH VISIT -- Audio/Visual   }  Services were provided through a video synchronous discussion virtually to substitute for in-person clinic visit. Pt gave verbal informed consent to participate in telehealth services.     Conducted a risk-benefit analysis and determined that the patient's presenting problems are consistent with the use of telepsychology. Determined that the patient has sufficient knowledge and skills in the use of technology enabling them to adequately benefit from telepsychology. It was determined that this patient was able to be properly treated without an in-person session. Patient verified that they were currently located at the [place] address that was provided during registration.    Verified the following information:  Patient's identification: Yes  Patient location: [address] [place] [address]   Patient's call back number: [phone]   Patient's emergency contact's name and number, as well as permission to contact them if needed: Extended Emergency Contact Information  Primary Emergency Contact: Blanca Harry  Address: [address] [place] [address] [facility]  Home Phone: [phone]  Relation: Parent  Secondary Emergency Contact: Jaron Todd  Address: [address] [place] [address]  Home Phone: [phone]  Mobile Phone: [phone]  Relation: Spouse     Provider location: [place], [place]     S:  Pt endorses that she is doing better, had an appt with candice islas, and it went really well. She was able to

## 2024-03-05 NOTE — PATIENT INSTRUCTIONS
Unlocking' US with mahendra nelson and rosa palma on what is love  Keep up the great work [facility] Sera Davis: Improving Sexual & Urological Health in Males and Females  [facility]  Return to see Chelita in 4 weeks.

## 2024-04-02 ENCOUNTER — TELEMEDICINE (OUTPATIENT)
Dept: PSYCHOLOGY | Age: 29
End: 2024-04-02
Payer: COMMERCIAL

## 2024-04-02 DIAGNOSIS — F33.41 RECURRENT MAJOR DEPRESSIVE DISORDER, IN PARTIAL REMISSION (HCC): Primary | ICD-10-CM

## 2024-04-02 DIAGNOSIS — F41.9 ANXIETY: ICD-10-CM

## 2024-04-02 PROCEDURE — 90834 PSYTX W PT 45 MINUTES: CPT | Performed by: SOCIAL WORKER

## 2024-04-02 NOTE — PATIENT INSTRUCTIONS
[facility]: erasing fears and traumas based on the modern neuroscience of fear   When I show my emotions , I am too much for people to handle and my emotions make people uncomfortable and that makes me feel shame  Return to see Chelita in 6 weeks.

## 2024-04-02 NOTE — PROGRESS NOTES
Behavioral Health Consultation  STEPHY Arauz  4/2/2024  1:33 PM EDT      Time spent with Patient: 45 minutes  This is patient's fourth [facility] appointment.    Reason for Consult:    Chief Complaint   Patient presents with    Anxiety    Depression     Referring Provider: Baldev Velazquez MD  [address] [facility] [address] [place] [id number]    Feedback given to PCP.    TELEHEALTH VISIT -- Audio/Visual   }  Services were provided through a video synchronous discussion virtually to substitute for in-person clinic visit. Pt gave verbal informed consent to participate in telehealth services.     Conducted a risk-benefit analysis and determined that the patient's presenting problems are consistent with the use of telepsychology. Determined that the patient has sufficient knowledge and skills in the use of technology enabling them to adequately benefit from telepsychology. It was determined that this patient was able to be properly treated without an in-person session. Patient verified that they were currently located at the [place] address that was provided during registration.    Verified the following information:  Patient's identification: Yes  Patient location: [address] [place] [address]   Patient's call back number: [phone]   Patient's emergency contact's name and number, as well as permission to contact them if needed: Extended Emergency Contact Information  Primary Emergency Contact: Blanca Harry  Address: [address] [place] [address] [facility]  Home Phone: [phone]  Relation: Parent  Secondary Emergency Contact: Jaron Todd  Address: [address] [place] [address]  Home Phone: [phone]  Mobile Phone: [phone]  Relation: Spouse     Provider location: [place], [place]     S:  Pt is doing well overall, enjoyed the [facility] lab with Dr. Stephania Davis on improving sexual health. Has been seeing

## 2024-04-29 DIAGNOSIS — F41.9 ANXIETY: ICD-10-CM

## 2024-04-29 RX ORDER — BUSPIRONE HYDROCHLORIDE 5 MG/1
5 TABLET ORAL 2 TIMES DAILY
Qty: 180 TABLET | Refills: 0 | Status: SHIPPED | OUTPATIENT
Start: 2024-04-29

## 2024-05-21 ENCOUNTER — TELEMEDICINE (OUTPATIENT)
Dept: PSYCHOLOGY | Age: 29
End: 2024-05-21

## 2024-05-21 DIAGNOSIS — F33.41 RECURRENT MAJOR DEPRESSIVE DISORDER, IN PARTIAL REMISSION (HCC): Primary | ICD-10-CM

## 2024-05-21 PROCEDURE — NBSRV NON-BILLABLE SERVICE: Performed by: SOCIAL WORKER

## 2024-09-05 ENCOUNTER — TELEPHONE (OUTPATIENT)
Dept: FAMILY MEDICINE CLINIC | Age: 29
End: 2024-09-05

## 2024-09-05 RX ORDER — BUSPIRONE HYDROCHLORIDE 10 MG/1
10 TABLET ORAL 2 TIMES DAILY
Qty: 60 TABLET | Refills: 1 | Status: SHIPPED | OUTPATIENT
Start: 2024-09-05 | End: 2024-11-04

## 2024-09-05 NOTE — TELEPHONE ENCOUNTER
I would recommend an appointment.  BuSpar could be increased to 10 mg twice daily.  Okay to send a refill request for it and for the hydroxyzine in the interim.  Arrange appointment in 4 to 6 weeks

## 2024-09-05 NOTE — TELEPHONE ENCOUNTER
Pt is struggling with anxiety, she states she did get relief with the buspar but it doesn't seem to be working anymore, she wasn't sure if you could inc or change any meds or if she would need an appt? Pt also states she took hydroxyzine PRN as well and has ran out of that, could you refill that? Pt uses [facility]

## 2024-09-10 DIAGNOSIS — F41.9 ANXIETY: Primary | ICD-10-CM

## 2024-09-10 RX ORDER — HYDROXYZINE HYDROCHLORIDE 10 MG/1
25 TABLET, FILM COATED ORAL 3 TIMES DAILY PRN
Qty: 30 TABLET | Refills: 3 | Status: SHIPPED | OUTPATIENT
Start: 2024-09-10

## 2024-11-15 RX ORDER — BUSPIRONE HYDROCHLORIDE 10 MG/1
10 TABLET ORAL 2 TIMES DAILY
Qty: 60 TABLET | Refills: 1 | Status: SHIPPED | OUTPATIENT
Start: 2024-11-15

## 2024-11-25 ENCOUNTER — OFFICE VISIT (OUTPATIENT)
Dept: FAMILY MEDICINE CLINIC | Age: 29
End: 2024-11-25

## 2024-11-25 VITALS
BODY MASS INDEX: 23.82 KG/M2 | DIASTOLIC BLOOD PRESSURE: 86 MMHG | HEIGHT: 65 IN | WEIGHT: 143 LBS | OXYGEN SATURATION: 99 % | SYSTOLIC BLOOD PRESSURE: 122 MMHG | HEART RATE: 116 BPM

## 2024-11-25 DIAGNOSIS — Z23 NEED FOR IMMUNIZATION AGAINST INFLUENZA: ICD-10-CM

## 2024-11-25 DIAGNOSIS — H91.93 BILATERAL HEARING LOSS, UNSPECIFIED HEARING LOSS TYPE: ICD-10-CM

## 2024-11-25 DIAGNOSIS — F41.9 ANXIETY: Primary | ICD-10-CM

## 2024-11-25 DIAGNOSIS — E78.2 MIXED HYPERLIPIDEMIA: ICD-10-CM

## 2024-11-25 RX ORDER — HYDROXYZINE HYDROCHLORIDE 25 MG/1
25 TABLET, FILM COATED ORAL NIGHTLY PRN
Qty: 30 TABLET | Refills: 3 | Status: SHIPPED | OUTPATIENT
Start: 2024-11-25 | End: 2025-03-25

## 2024-11-25 RX ORDER — GABAPENTIN 300 MG/1
300 CAPSULE ORAL 2 TIMES DAILY
COMMUNITY

## 2024-11-25 SDOH — ECONOMIC STABILITY: FOOD INSECURITY: WITHIN THE PAST 12 MONTHS, THE FOOD YOU BOUGHT JUST DIDN'T LAST AND YOU DIDN'T HAVE MONEY TO GET MORE.: NEVER TRUE

## 2024-11-25 SDOH — ECONOMIC STABILITY: TRANSPORTATION INSECURITY
IN THE PAST 12 MONTHS, HAS LACK OF TRANSPORTATION KEPT YOU FROM MEETINGS, WORK, OR FROM GETTING THINGS NEEDED FOR DAILY LIVING?: NO

## 2024-11-25 SDOH — ECONOMIC STABILITY: FOOD INSECURITY: WITHIN THE PAST 12 MONTHS, YOU WORRIED THAT YOUR FOOD WOULD RUN OUT BEFORE YOU GOT MONEY TO BUY MORE.: NEVER TRUE

## 2024-11-25 SDOH — ECONOMIC STABILITY: INCOME INSECURITY: HOW HARD IS IT FOR YOU TO PAY FOR THE VERY BASICS LIKE FOOD, HOUSING, MEDICAL CARE, AND HEATING?: NOT HARD AT ALL

## 2024-11-25 ASSESSMENT — ENCOUNTER SYMPTOMS: SHORTNESS OF BREATH: 0

## 2024-11-25 NOTE — PROGRESS NOTES
TABLET BY MOUTH 2 TIMES A DAY Yes Baldev Velazquez MD   estradiol (ESTRACE VAGINAL) 0.1 MG/GM vaginal cream Place 1 g vaginally Twice a Week Yes Yesi Ronquillo DO   dibucaine 1 % perianal ointment Place around the anus 4 times daily as needed for Pain  Patient not taking: Reported on 1/22/2024  Yesi Ronquillo DO       No Known Allergies    OBJECTIVE:    /86   Pulse (!) 116   Ht 1.651 m (5' 5\")   Wt 64.9 kg (143 lb)   SpO2 99%   BMI 23.80 kg/m²     BP Readings from Last 2 Encounters:   11/25/24 122/86   01/22/24 102/72       Wt Readings from Last 3 Encounters:   11/25/24 64.9 kg (143 lb)   01/22/24 67.9 kg (149 lb 12.8 oz)   07/07/22 61.1 kg (134 lb 12.8 oz)       Physical Exam  Constitutional:       Appearance: Normal appearance.   HENT:      Head: Normocephalic and atraumatic.      Right Ear: Tympanic membrane and ear canal normal.      Left Ear: Tympanic membrane and ear canal normal.   Eyes:      Extraocular Movements: Extraocular movements intact.   Cardiovascular:      Rate and Rhythm: Normal rate and regular rhythm.   Pulmonary:      Effort: Pulmonary effort is normal.      Breath sounds: Normal breath sounds.   Abdominal:      Palpations: Abdomen is soft.      Tenderness: There is no abdominal tenderness.   Skin:     General: Skin is warm and dry.   Neurological:      General: No focal deficit present.      Mental Status: She is alert and oriented to person, place, and time.   Psychiatric:         Mood and Affect: Mood is anxious.         Behavior: Behavior normal.           ASSESSMENT/PLAN:    1. Anxiety  Recent issues w/ triggers.  Seeing therapist as well.  Refill prn med.  Discussed could add an extra 5mg buspar (total of 15mg) at bedtime and monitor sxs.    - hydrOXYzine HCl (ATARAX) 25 MG tablet; Take 1 tablet by mouth nightly as needed for Anxiety  Dispense: 30 tablet; Refill: 3    2. Bilateral hearing loss, unspecified hearing loss type  No sig finding to ears.  Does have

## 2024-12-11 NOTE — PROGRESS NOTES
[facility]  DIVISION OF OTOLARYNGOLOGY- HEAD & NECK SURGERY  CONSULT    Patient Name: Shauna Todd  Medical Record Number:  [id number]  Primary Care Physician:  Baldev Velazquez MD  Date of Consultation: 12/16/2024    Chief Complaint:   Chief Complaint   Patient presents with    New Patient    Sinus Problem     Nasal spray and allergy meds not helping     Hearing Problem     Aware needs to come back for testing      HISTORY OF PRESENT ILLNESS  Shauna is a(n) 29 y.o. female who presents for evaluation of nasal congestion.  She states that this has been going on for a while.  She uses Flonase and this is not significantly helping her sinuses.  She states that she uses Breathe Right strips at night for relief.  She also is concerned that she is having decreased hearing.  Patient Active Problem List   Diagnosis    Irritable bowel syndrome with diarrhea    Metrorrhagia    Underweight    Maternal renal lithiasis, history of    Anxiety    Hyperlipemia     Past Surgical History:   Procedure Laterality Date    WISDOM TOOTH EXTRACTION       Family History   Problem Relation Age of Onset    Other Mother     High Cholesterol Father     Heart Disease Maternal Grandmother         valve replaced    COPD Maternal Grandmother     Coronary Art Dis Maternal Grandmother     Heart Disease Maternal Grandfather     Stroke Paternal Grandfather     Other Maternal Uncle     Other Paternal Aunt     Cancer Paternal Uncle 54        pancreatic cancer     Social History     Socioeconomic History    Marital status:      Spouse name: Not on file    Number of children: Not on file    Years of education: Not on file    Highest education level: Not on file   Occupational History    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

## 2024-12-16 ENCOUNTER — OFFICE VISIT (OUTPATIENT)
Dept: ENT CLINIC | Age: 29
End: 2024-12-16
Payer: COMMERCIAL

## 2024-12-16 VITALS
HEART RATE: 89 BPM | HEIGHT: 65 IN | SYSTOLIC BLOOD PRESSURE: 112 MMHG | BODY MASS INDEX: 23.82 KG/M2 | WEIGHT: 143 LBS | DIASTOLIC BLOOD PRESSURE: 75 MMHG

## 2024-12-16 DIAGNOSIS — H91.93 BILATERAL HEARING LOSS, UNSPECIFIED HEARING LOSS TYPE: ICD-10-CM

## 2024-12-16 DIAGNOSIS — J30.2 SEASONAL ALLERGIES: Primary | ICD-10-CM

## 2024-12-16 PROCEDURE — 99204 OFFICE O/P NEW MOD 45 MIN: CPT | Performed by: STUDENT IN AN ORGANIZED HEALTH CARE EDUCATION/TRAINING PROGRAM

## 2024-12-16 PROCEDURE — 31231 NASAL ENDOSCOPY DX: CPT | Performed by: STUDENT IN AN ORGANIZED HEALTH CARE EDUCATION/TRAINING PROGRAM

## 2024-12-16 RX ORDER — AZELASTINE HYDROCHLORIDE, FLUTICASONE PROPIONATE 137; 50 UG/1; UG/1
1 SPRAY, METERED NASAL 2 TIMES DAILY
Qty: 23 G | Refills: 3 | Status: SHIPPED | OUTPATIENT
Start: 2024-12-16 | End: 2025-03-16

## 2024-12-16 ASSESSMENT — ENCOUNTER SYMPTOMS
SHORTNESS OF BREATH: 0
EYE PAIN: 0
NAUSEA: 0
VOMITING: 0
COUGH: 0
RHINORRHEA: 0

## 2025-02-12 NOTE — PROGRESS NOTES
[facility]  DIVISION OF OTOLARYNGOLOGY- HEAD & NECK SURGERY  CONSULT    Patient Name: Shauna Todd  Medical Record Number:  [id number]  Primary Care Physician:  Baldev Velazquez MD  Date of Consultation: 2/17/2025    Chief Complaint:   Chief Complaint   Patient presents with    Hearing Problem    Sinus Problem     Sinus congestion feeling better after starting Flonase       HISTORY OF PRESENT ILLNESS  Shauna is a(n) 30 y.o. female who presents for evaluation of nasal congestion.  She states that this has been going on for a while.  She uses Flonase and this is not significantly helping her sinuses.  She states that she uses Breathe Right strips at night for relief.  She also is concerned that she is having decreased hearing.    Interval History 02/17/25   Shauna states that the Flonase is working well for her.    Patient Active Problem List   Diagnosis    Irritable bowel syndrome with diarrhea    Metrorrhagia    Underweight    Maternal renal lithiasis, history of    Anxiety    Hyperlipemia     Past Surgical History:   Procedure Laterality Date    WISDOM TOOTH EXTRACTION       Family History   Problem Relation Age of Onset    Other Mother     High Cholesterol Father     Heart Disease Maternal Grandmother         valve replaced    COPD Maternal Grandmother     Coronary Art Dis Maternal Grandmother     Heart Disease Maternal Grandfather     Stroke Paternal Grandfather     Other Maternal Uncle     Other Paternal Aunt     Cancer Paternal Uncle 54        pancreatic cancer     Social History     Socioeconomic History    Marital status:      Spouse name: Not on file    Number of children: Not on file    Years of education: Not on file    Highest education level: Not on file   Occupational History    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

## 2025-02-17 ENCOUNTER — OFFICE VISIT (OUTPATIENT)
Dept: ENT CLINIC | Age: 30
End: 2025-02-17
Payer: COMMERCIAL

## 2025-02-17 ENCOUNTER — PROCEDURE VISIT (OUTPATIENT)
Dept: AUDIOLOGY | Age: 30
End: 2025-02-17
Payer: COMMERCIAL

## 2025-02-17 VITALS
HEART RATE: 77 BPM | HEIGHT: 65 IN | WEIGHT: 135 LBS | SYSTOLIC BLOOD PRESSURE: 111 MMHG | DIASTOLIC BLOOD PRESSURE: 81 MMHG | BODY MASS INDEX: 22.49 KG/M2

## 2025-02-17 DIAGNOSIS — Z01.10 ENCOUNTER FOR EXAM OF EARS AND HEARING W/O ABNORMAL FINDINGS: Primary | ICD-10-CM

## 2025-02-17 DIAGNOSIS — J30.2 SEASONAL ALLERGIES: ICD-10-CM

## 2025-02-17 DIAGNOSIS — Z01.10 NORMAL HEARING TEST OF BOTH EARS: Primary | ICD-10-CM

## 2025-02-17 PROCEDURE — 92567 TYMPANOMETRY: CPT | Performed by: AUDIOLOGIST

## 2025-02-17 PROCEDURE — 92557 COMPREHENSIVE HEARING TEST: CPT | Performed by: AUDIOLOGIST

## 2025-02-17 PROCEDURE — 99214 OFFICE O/P EST MOD 30 MIN: CPT | Performed by: STUDENT IN AN ORGANIZED HEALTH CARE EDUCATION/TRAINING PROGRAM

## 2025-02-17 RX ORDER — FLUOXETINE 10 MG/1
10 TABLET, FILM COATED ORAL DAILY
COMMUNITY

## 2025-02-17 RX ORDER — FLUTICASONE PROPIONATE 50 MCG
1 SPRAY, SUSPENSION (ML) NASAL DAILY PRN
COMMUNITY

## 2025-02-17 NOTE — PROGRESS NOTES
function.      Reliability: Good   Transducer: Inserts    See scanned audiogram dated 2/17/2025  for results.        PATIENT EDUCATION:       The following items were discussed with the patient:   - Good Communication Strategies    Educational information was shared in the After Visit Summary.                                                RECOMMENDATIONS:                                                                                                                                                                                                                                                            The following items are recommended based on patient report and results from today's appointment:   - Continue medical follow-up with Paul Landrum DO.    - Retest hearing as medically indicated and/or sooner if a change in hearing is noted.  - Utilize \"Good Communication Strategies\" as discussed to assist in speech understanding with communication partners.       Tita Sawyer  Audiologist    Chart CC'd to: Paul Landrum DO      Degree of   Hearing Sensitivity dB Range   Within Normal Limits (WNL) 0 - 20   Mild 20 - 40   Moderate 40 - 55   Moderately-Severe 55 - 70   Severe 70 - 90   Profound 90 +

## 2025-03-10 RX ORDER — BUSPIRONE HYDROCHLORIDE 10 MG/1
10 TABLET ORAL 2 TIMES DAILY
Qty: 180 TABLET | Refills: 3 | Status: SHIPPED | OUTPATIENT
Start: 2025-03-10